# Patient Record
Sex: MALE | Race: BLACK OR AFRICAN AMERICAN | Employment: OTHER | URBAN - METROPOLITAN AREA
[De-identification: names, ages, dates, MRNs, and addresses within clinical notes are randomized per-mention and may not be internally consistent; named-entity substitution may affect disease eponyms.]

---

## 2017-08-14 ENCOUNTER — HOSPITAL ENCOUNTER (OUTPATIENT)
Dept: LAB | Age: 82
Discharge: HOME | End: 2017-08-14
Payer: MEDICARE

## 2017-08-14 DIAGNOSIS — E11.9 DIABETES MELLITUS TYPE 2, CONTROLLED (HCC): ICD-10-CM

## 2017-08-14 DIAGNOSIS — I10 HYPERTENSION, ESSENTIAL: ICD-10-CM

## 2017-08-14 DIAGNOSIS — E78.5 OTHER AND UNSPECIFIED HYPERLIPIDEMIA: ICD-10-CM

## 2017-08-14 LAB
ALBUMIN SERPL BCP-MCNC: 3.2 G/DL (ref 3.4–5)
ALBUMIN/GLOB SERPL: 0.8 {RATIO} (ref 0.8–1.7)
ALP SERPL-CCNC: 66 U/L (ref 45–117)
ALT SERPL-CCNC: 25 U/L (ref 16–61)
ANION GAP BLD CALC-SCNC: 7 MMOL/L (ref 3–18)
AST SERPL W P-5'-P-CCNC: 28 U/L (ref 15–37)
BASOPHILS # BLD AUTO: 0 K/UL (ref 0–0.06)
BASOPHILS # BLD: 0 % (ref 0–2)
BILIRUB SERPL-MCNC: 0.3 MG/DL (ref 0.2–1)
BUN SERPL-MCNC: 10 MG/DL (ref 7–18)
BUN/CREAT SERPL: 9 (ref 12–20)
CALCIUM SERPL-MCNC: 9.6 MG/DL (ref 8.5–10.1)
CHLORIDE SERPL-SCNC: 104 MMOL/L (ref 100–108)
CHOLEST SERPL-MCNC: 163 MG/DL
CO2 SERPL-SCNC: 32 MMOL/L (ref 21–32)
CREAT SERPL-MCNC: 1.14 MG/DL (ref 0.6–1.3)
CREAT UR-MCNC: 226 MG/DL (ref 30–125)
DIFFERENTIAL METHOD BLD: ABNORMAL
EOSINOPHIL # BLD: 0.2 K/UL (ref 0–0.4)
EOSINOPHIL NFR BLD: 4 % (ref 0–5)
ERYTHROCYTE [DISTWIDTH] IN BLOOD BY AUTOMATED COUNT: 16 % (ref 11.6–14.5)
GLOBULIN SER CALC-MCNC: 3.9 G/DL (ref 2–4)
GLUCOSE SERPL-MCNC: 95 MG/DL (ref 74–99)
HBA1C MFR BLD: 6.7 % (ref 4.2–5.6)
HCT VFR BLD AUTO: 40.2 % (ref 36–48)
HDLC SERPL-MCNC: 49 MG/DL (ref 40–60)
HDLC SERPL: 3.3 {RATIO} (ref 0–5)
HGB BLD-MCNC: 13.2 G/DL (ref 13–16)
LDLC SERPL CALC-MCNC: 92.4 MG/DL (ref 0–100)
LIPID PROFILE,FLP: NORMAL
LYMPHOCYTES # BLD AUTO: 49 % (ref 21–52)
LYMPHOCYTES # BLD: 2.3 K/UL (ref 0.9–3.6)
MCH RBC QN AUTO: 27.2 PG (ref 24–34)
MCHC RBC AUTO-ENTMCNC: 32.8 G/DL (ref 31–37)
MCV RBC AUTO: 82.7 FL (ref 74–97)
MICROALBUMIN UR-MCNC: 1.67 MG/DL (ref 0–3)
MICROALBUMIN/CREAT UR-RTO: 7 MG/G (ref 0–30)
MONOCYTES # BLD: 0.5 K/UL (ref 0.05–1.2)
MONOCYTES NFR BLD AUTO: 11 % (ref 3–10)
NEUTS SEG # BLD: 1.6 K/UL (ref 1.8–8)
NEUTS SEG NFR BLD AUTO: 36 % (ref 40–73)
PLATELET # BLD AUTO: 214 K/UL (ref 135–420)
PMV BLD AUTO: 10.6 FL (ref 9.2–11.8)
POTASSIUM SERPL-SCNC: 3.6 MMOL/L (ref 3.5–5.5)
PROT SERPL-MCNC: 7.1 G/DL (ref 6.4–8.2)
RBC # BLD AUTO: 4.86 M/UL (ref 4.7–5.5)
SODIUM SERPL-SCNC: 143 MMOL/L (ref 136–145)
TRIGL SERPL-MCNC: 108 MG/DL (ref ?–150)
VLDLC SERPL CALC-MCNC: 21.6 MG/DL
WBC # BLD AUTO: 4.6 K/UL (ref 4.6–13.2)

## 2017-08-14 PROCEDURE — 85025 COMPLETE CBC W/AUTO DIFF WBC: CPT | Performed by: INTERNAL MEDICINE

## 2017-08-14 PROCEDURE — 83036 HEMOGLOBIN GLYCOSYLATED A1C: CPT | Performed by: INTERNAL MEDICINE

## 2017-08-14 PROCEDURE — 82043 UR ALBUMIN QUANTITATIVE: CPT | Performed by: INTERNAL MEDICINE

## 2017-08-14 PROCEDURE — 80053 COMPREHEN METABOLIC PANEL: CPT | Performed by: INTERNAL MEDICINE

## 2017-08-14 PROCEDURE — 80061 LIPID PANEL: CPT | Performed by: INTERNAL MEDICINE

## 2017-08-14 PROCEDURE — 36415 COLL VENOUS BLD VENIPUNCTURE: CPT | Performed by: INTERNAL MEDICINE

## 2018-01-17 ENCOUNTER — HOSPITAL ENCOUNTER (OUTPATIENT)
Dept: LAB | Age: 83
Discharge: HOME | End: 2018-01-17
Payer: MEDICARE

## 2018-01-17 DIAGNOSIS — E78.5 HYPERLIPEMIA: ICD-10-CM

## 2018-01-17 LAB
ALT SERPL-CCNC: 26 U/L (ref 16–61)
AST SERPL-CCNC: 32 U/L (ref 15–37)
CHOLEST SERPL-MCNC: 156 MG/DL
CK SERPL-CCNC: 233 U/L (ref 39–308)
HDLC SERPL-MCNC: 59 MG/DL (ref 40–60)
HDLC SERPL: 2.6 {RATIO} (ref 0–5)
LDLC SERPL CALC-MCNC: 79.4 MG/DL (ref 0–100)
LIPID PROFILE,FLP: NORMAL
TRIGL SERPL-MCNC: 88 MG/DL (ref ?–150)
VLDLC SERPL CALC-MCNC: 17.6 MG/DL

## 2018-01-17 PROCEDURE — 80061 LIPID PANEL: CPT | Performed by: INTERNAL MEDICINE

## 2018-01-17 PROCEDURE — 36415 COLL VENOUS BLD VENIPUNCTURE: CPT | Performed by: INTERNAL MEDICINE

## 2018-01-17 PROCEDURE — 82550 ASSAY OF CK (CPK): CPT | Performed by: INTERNAL MEDICINE

## 2018-01-17 PROCEDURE — 84460 ALANINE AMINO (ALT) (SGPT): CPT | Performed by: INTERNAL MEDICINE

## 2018-01-17 PROCEDURE — 84450 TRANSFERASE (AST) (SGOT): CPT | Performed by: INTERNAL MEDICINE

## 2018-12-03 ENCOUNTER — HOSPITAL ENCOUNTER (OUTPATIENT)
Dept: LAB | Age: 83
Discharge: HOME | End: 2018-12-03
Payer: MEDICARE

## 2018-12-03 DIAGNOSIS — E11.9 DIABETES MELLITUS (HCC): ICD-10-CM

## 2018-12-03 DIAGNOSIS — I10 ESSENTIAL HYPERTENSION, MALIGNANT: ICD-10-CM

## 2018-12-03 DIAGNOSIS — E78.5 HYPERLIPEMIA: ICD-10-CM

## 2018-12-03 LAB
ALBUMIN SERPL-MCNC: 3.3 G/DL (ref 3.4–5)
ALBUMIN/GLOB SERPL: 0.8 {RATIO} (ref 0.8–1.7)
ALP SERPL-CCNC: 82 U/L (ref 45–117)
ALT SERPL-CCNC: 23 U/L (ref 16–61)
ANION GAP SERPL CALC-SCNC: 6 MMOL/L (ref 3–18)
AST SERPL-CCNC: 30 U/L (ref 15–37)
BILIRUB SERPL-MCNC: 0.3 MG/DL (ref 0.2–1)
BUN SERPL-MCNC: 14 MG/DL (ref 7–18)
BUN/CREAT SERPL: 12 (ref 12–20)
CALCIUM SERPL-MCNC: 9.5 MG/DL (ref 8.5–10.1)
CHLORIDE SERPL-SCNC: 104 MMOL/L (ref 100–108)
CHOLEST SERPL-MCNC: 151 MG/DL
CO2 SERPL-SCNC: 31 MMOL/L (ref 21–32)
CREAT SERPL-MCNC: 1.19 MG/DL (ref 0.6–1.3)
GLOBULIN SER CALC-MCNC: 4 G/DL (ref 2–4)
GLUCOSE SERPL-MCNC: 83 MG/DL (ref 74–99)
HBA1C MFR BLD: 6.4 % (ref 4.2–5.6)
HDLC SERPL-MCNC: 49 MG/DL (ref 40–60)
HDLC SERPL: 3.1 {RATIO} (ref 0–5)
LDLC SERPL CALC-MCNC: 87.4 MG/DL (ref 0–100)
LIPID PROFILE,FLP: NORMAL
POTASSIUM SERPL-SCNC: 3.6 MMOL/L (ref 3.5–5.5)
PROT SERPL-MCNC: 7.3 G/DL (ref 6.4–8.2)
SODIUM SERPL-SCNC: 141 MMOL/L (ref 136–145)
TRIGL SERPL-MCNC: 73 MG/DL (ref ?–150)
VLDLC SERPL CALC-MCNC: 14.6 MG/DL

## 2018-12-03 PROCEDURE — 36415 COLL VENOUS BLD VENIPUNCTURE: CPT

## 2018-12-03 PROCEDURE — 83036 HEMOGLOBIN GLYCOSYLATED A1C: CPT

## 2018-12-03 PROCEDURE — 80061 LIPID PANEL: CPT

## 2018-12-03 PROCEDURE — 80053 COMPREHEN METABOLIC PANEL: CPT

## 2019-02-11 PROBLEM — C64.2 RENAL CELL CARCINOMA, LEFT (HCC): Status: ACTIVE | Noted: 2018-11-06

## 2019-06-07 ENCOUNTER — HOSPITAL ENCOUNTER (OUTPATIENT)
Dept: LAB | Age: 84
Discharge: HOME | End: 2019-06-07
Payer: MEDICARE

## 2019-06-07 DIAGNOSIS — E27.9 DISORDER OF ADRENAL GLAND, UNSPECIFIED (HCC): ICD-10-CM

## 2019-06-07 PROCEDURE — 36415 COLL VENOUS BLD VENIPUNCTURE: CPT

## 2019-06-07 PROCEDURE — 82533 TOTAL CORTISOL: CPT

## 2019-06-08 LAB — CORTIS AM PEAK SERPL-MCNC: 1.2 UG/DL (ref 4.3–22.45)

## 2021-05-03 ENCOUNTER — TELEPHONE (OUTPATIENT)
Dept: INTERNAL MEDICINE CLINIC | Age: 86
End: 2021-05-03

## 2021-05-03 ENCOUNTER — DOCUMENTATION ONLY (OUTPATIENT)
Dept: INTERNAL MEDICINE CLINIC | Age: 86
End: 2021-05-03

## 2021-05-03 ENCOUNTER — OFFICE VISIT (OUTPATIENT)
Dept: INTERNAL MEDICINE CLINIC | Age: 86
End: 2021-05-03
Payer: MEDICARE

## 2021-05-03 ENCOUNTER — HOSPITAL ENCOUNTER (OUTPATIENT)
Dept: LAB | Age: 86
Discharge: HOME | End: 2021-05-03
Payer: MEDICARE

## 2021-05-03 VITALS
HEIGHT: 68 IN | TEMPERATURE: 98.1 F | SYSTOLIC BLOOD PRESSURE: 133 MMHG | WEIGHT: 188.6 LBS | DIASTOLIC BLOOD PRESSURE: 68 MMHG | BODY MASS INDEX: 28.58 KG/M2 | OXYGEN SATURATION: 99 % | RESPIRATION RATE: 12 BRPM | HEART RATE: 73 BPM

## 2021-05-03 DIAGNOSIS — E11.9 TYPE 2 DIABETES MELLITUS TREATED WITHOUT INSULIN (HCC): ICD-10-CM

## 2021-05-03 DIAGNOSIS — E78.00 PURE HYPERCHOLESTEROLEMIA: ICD-10-CM

## 2021-05-03 DIAGNOSIS — F43.21 GRIEF: ICD-10-CM

## 2021-05-03 DIAGNOSIS — I10 ESSENTIAL HYPERTENSION: Primary | ICD-10-CM

## 2021-05-03 DIAGNOSIS — I10 ESSENTIAL HYPERTENSION: ICD-10-CM

## 2021-05-03 DIAGNOSIS — C64.2 RENAL CELL CARCINOMA OF LEFT KIDNEY (HCC): ICD-10-CM

## 2021-05-03 DIAGNOSIS — E27.8 ADRENAL MASS (HCC): ICD-10-CM

## 2021-05-03 DIAGNOSIS — R01.1 HEART MURMUR: ICD-10-CM

## 2021-05-03 LAB
ALBUMIN SERPL-MCNC: 3.3 G/DL (ref 3.4–5)
ALBUMIN/GLOB SERPL: 0.8 {RATIO} (ref 0.8–1.7)
ALP SERPL-CCNC: 86 U/L (ref 45–117)
ALT SERPL-CCNC: 22 U/L (ref 16–61)
ANION GAP SERPL CALC-SCNC: 6 MMOL/L (ref 3–18)
AST SERPL-CCNC: 28 U/L (ref 10–38)
BILIRUB SERPL-MCNC: 0.3 MG/DL (ref 0.2–1)
BUN SERPL-MCNC: 12 MG/DL (ref 7–18)
BUN/CREAT SERPL: 11 (ref 12–20)
CALCIUM SERPL-MCNC: 10.2 MG/DL (ref 8.5–10.1)
CHLORIDE SERPL-SCNC: 104 MMOL/L (ref 100–111)
CHOLEST SERPL-MCNC: 174 MG/DL
CO2 SERPL-SCNC: 32 MMOL/L (ref 21–32)
CREAT SERPL-MCNC: 1.11 MG/DL (ref 0.6–1.3)
CREAT UR-MCNC: 174 MG/DL (ref 30–125)
EST. AVERAGE GLUCOSE BLD GHB EST-MCNC: 117 MG/DL
GLOBULIN SER CALC-MCNC: 4 G/DL (ref 2–4)
GLUCOSE SERPL-MCNC: 116 MG/DL (ref 74–99)
HBA1C MFR BLD: 5.7 % (ref 4.2–5.6)
HDLC SERPL-MCNC: 40 MG/DL (ref 40–60)
HDLC SERPL: 4.4 {RATIO} (ref 0–5)
LDLC SERPL CALC-MCNC: 98 MG/DL (ref 0–100)
LIPID PROFILE,FLP: ABNORMAL
MICROALBUMIN UR-MCNC: 2.49 MG/DL (ref 0–3)
MICROALBUMIN/CREAT UR-RTO: 14 MG/G (ref 0–30)
POTASSIUM SERPL-SCNC: 3.7 MMOL/L (ref 3.5–5.5)
PROT SERPL-MCNC: 7.3 G/DL (ref 6.4–8.2)
SODIUM SERPL-SCNC: 142 MMOL/L (ref 136–145)
TRIGL SERPL-MCNC: 180 MG/DL (ref ?–150)
VLDLC SERPL CALC-MCNC: 36 MG/DL

## 2021-05-03 PROCEDURE — 80053 COMPREHEN METABOLIC PANEL: CPT

## 2021-05-03 PROCEDURE — 99215 OFFICE O/P EST HI 40 MIN: CPT | Performed by: INTERNAL MEDICINE

## 2021-05-03 PROCEDURE — 36415 COLL VENOUS BLD VENIPUNCTURE: CPT

## 2021-05-03 PROCEDURE — 82570 ASSAY OF URINE CREATININE: CPT

## 2021-05-03 PROCEDURE — G0463 HOSPITAL OUTPT CLINIC VISIT: HCPCS | Performed by: INTERNAL MEDICINE

## 2021-05-03 PROCEDURE — 80061 LIPID PANEL: CPT

## 2021-05-03 PROCEDURE — 83036 HEMOGLOBIN GLYCOSYLATED A1C: CPT

## 2021-05-03 RX ORDER — METOPROLOL SUCCINATE 25 MG/1
25 TABLET, EXTENDED RELEASE ORAL DAILY
Qty: 90 TAB | Refills: 3 | Status: SHIPPED | OUTPATIENT
Start: 2021-05-03 | End: 2022-06-17 | Stop reason: SDUPTHER

## 2021-05-03 RX ORDER — POTASSIUM CHLORIDE 750 MG/1
10 TABLET, EXTENDED RELEASE ORAL DAILY
Qty: 90 TAB | Refills: 3 | Status: SHIPPED | OUTPATIENT
Start: 2021-05-03 | End: 2022-05-05 | Stop reason: SDUPTHER

## 2021-05-03 NOTE — PROGRESS NOTES
HPI    Mr. Ame Hull is known to me from my previous practice. He is here for routine check. Since I last saw him in January of this year, his wife passed away from complications of congestive heart failure. He has one of his children living with him now, and when she moves out, another will be moving into his house with him. He is happy about this. He feels he is coping fairly well with his grief. He cooks for himself, and is sleeping well. 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 90 Tab 3    potassium chloride (KLOR-CON) 10 mEq tablet Take 1 Tab by mouth daily. 90 Tab 3    latanoprost (XALATAN) 0.005 % ophthalmic solution 1 Drop.  therapeutic multivitamin (THEREMS) tablet Take 1 Tab by mouth.  rosuvastatin (CRESTOR) 10 mg tablet       ALPHAGAN P 0.1 % ophthalmic solution INSTILL 1 DROP INTO BOTH EYES TWICE A DAY  4    fluticasone-salmeterol (ADVAIR DISKUS) 250-50 mcg/dose diskus inhaler Take 1 Puff by inhalation every twelve (12) hours.  hydrochlorothiazide (HYDRODIURIL) 25 mg tablet   0    tiotropium (SPIRIVA WITH HANDIHALER) 18 mcg inhalation capsule Take 1 Cap by inhalation daily.  simvastatin (ZOCOR) 40 mg tablet Take 1 Tab by mouth nightly. 90 Tab 3    amlodipine (NORVASC) 5 mg tablet Take 1 Tab by mouth daily.  90 Tab 3 Allergies   Allergen Reactions    Ace Inhibitors Anaphylaxis    Pravastatin Myalgia    Sulfa (Sulfonamide Antibiotics) Unknown (comments)        Social History     Socioeconomic History    Marital status:      Spouse name: Not on file    Number of children: Not on file    Years of education: Not on file    Highest education level: Not on file   Occupational History    Not on file   Social Needs    Financial resource strain: Not on file    Food insecurity     Worry: Not on file     Inability: Not on file    Transportation needs     Medical: Not on file     Non-medical: Not on file   Tobacco Use    Smoking status: Former Smoker     Quit date: 3/15/2009     Years since quittin.1    Smokeless tobacco: Never Used   Substance and Sexual Activity    Alcohol use: No    Drug use: No    Sexual activity: Never   Lifestyle    Physical activity     Days per week: Not on file     Minutes per session: Not on file    Stress: Not on file   Relationships    Social connections     Talks on phone: Not on file     Gets together: Not on file     Attends Evangelical service: Not on file     Active member of club or organization: Not on file     Attends meetings of clubs or organizations: Not on file     Relationship status: Not on file    Intimate partner violence     Fear of current or ex partner: Not on file     Emotionally abused: Not on file     Physically abused: Not on file     Forced sexual activity: Not on file   Other Topics Concern    Not on file   Social History Narrative    Not on file        Family History   Problem Relation Age of Onset    Diabetes Mother         type 2    Heart Failure Brother         [facility] (BP 1 Location: Right upper arm, BP Patient Position: Sitting, BP Cuff Size: Large adult)   Pulse 73   Temp 98.1 °F (36.7 °C) (Oral)   Resp 12   Ht 5' 8\" (1.727 m)   Wt 188 lb 9.6 oz (85.5 kg)   SpO2 99%   BMI 28.68 kg/m² ROS    General: He denies weight loss or loss of appetite  HEENT: He denies headache or blurry vision  Cardiovascular: He denies chest pain, PND, orthopnea. Pulmonary: His dyspnea on exertion from COPD is stable. GI: No rectal bleeding  : No gross hematuria  Psych: Denies maribell anxiety or depression    Physical Exam    HEENT: He is well-developed, well-nourished, in no acute distress  HEENT: No icterus or pallor  Cardiovascular: Regular rate and rhythm, 2/6 systolic murmur, no rubs, no gallops  Lungs: Clear to auscultation, good air movement, speaks full sentences easily  Abdomen: Nontender, no organomegaly  Extremities: No clubbing, cyanosis, or edema. Pedal pulses are normal bilaterally. Neuro: Alert and oriented  Psych: Mood is appropriate            Diagnoses and all orders for this visit:    1. Essential hypertension  Comments:  Controlled, continue medication. Check BMP today. Urinalysis possibly checked by urology. Orders:  -     metoprolol succinate (TOPROL-XL) 25 mg XL tablet; Take 1 Tab by mouth daily. -     potassium chloride (KLOR-CON) 10 mEq tablet; Take 1 Tab by mouth daily.  -     METABOLIC PANEL, COMPREHENSIVE; Future    2. Renal cell carcinoma of left kidney [facility])  Comments:  No evidence of recurrence on January 2021 CT scan, reviewed with patient    3. Adrenal mass [facility])  Comments:  Characteristics suggestive of benign adenoma, stable on recent imaging January 2021    4. Pure hypercholesterolemia  Comments:  Check nonfasting lipid panel today  Orders:  -     METABOLIC PANEL, COMPREHENSIVE; Future  -     LIPID PANEL; Future    5. Type 2 diabetes mellitus treated without insulin (HCC)  Comments:  A1c, random urine microalbumin today  Orders:  -     HEMOGLOBIN A1C WITH EAG; Future  -     MICROALBUMIN, UR, RAND W/ MICROALB/CREAT RATIO; Future  -     METABOLIC PANEL, COMPREHENSIVE; Future    6. Grief  Comments:  Seems to be mourning his wife normally, no evidence of maribell depression.     7. Heart murmur  Comments:  Patient states does not recall previous diagnosis of this. I will review the chart. Asymptomatic, report new developments. Follow-up and Dispositions    · Return in about 6 months (around 11/3/2021), or if symptoms worsen or fail to improve, for 30 minute-- Medicare wellness. Total time spent on encounter, and minutes:    Review of old records plus precharting13  Updated review of medical, social, family, and surgical history with patient12   review of systems8  Exam7  Orders4    Total time spent on encounter: 42 minutes.       Ayanna Freire MD

## 2021-05-03 NOTE — PROGRESS NOTES
1. Have you been to the ER, urgent care clinic or hospitalized since your last visit? NO           2. Have you seen or consulted any other health care providers outside of the [address] [place] [address] since your last visit (Include any pap smears or colon screening)? NO    Do you have an Advanced Directive? YES    Would you like information on Advanced Directives?  NO

## 2021-05-03 NOTE — PROGRESS NOTES
At check out patient dropped off a form for bp medication price reduction. Form was put in the providers box.

## 2021-06-18 ENCOUNTER — OFFICE VISIT (OUTPATIENT)
Dept: INTERNAL MEDICINE CLINIC | Age: 86
End: 2021-06-18
Payer: MEDICARE

## 2021-06-18 VITALS
TEMPERATURE: 98.1 F | WEIGHT: 191 LBS | RESPIRATION RATE: 12 BRPM | OXYGEN SATURATION: 96 % | BODY MASS INDEX: 28.95 KG/M2 | HEART RATE: 60 BPM | SYSTOLIC BLOOD PRESSURE: 133 MMHG | HEIGHT: 68 IN | DIASTOLIC BLOOD PRESSURE: 78 MMHG

## 2021-06-18 DIAGNOSIS — R06.09 DYSPNEA ON EXERTION: ICD-10-CM

## 2021-06-18 DIAGNOSIS — E11.9 TYPE 2 DIABETES MELLITUS TREATED WITHOUT INSULIN (HCC): ICD-10-CM

## 2021-06-18 DIAGNOSIS — R60.0 BILATERAL LOWER EXTREMITY EDEMA: Primary | ICD-10-CM

## 2021-06-18 DIAGNOSIS — R07.89 OTHER CHEST PAIN: ICD-10-CM

## 2021-06-18 LAB — HBA1C MFR BLD HPLC: 5.9 %

## 2021-06-18 PROCEDURE — 99215 OFFICE O/P EST HI 40 MIN: CPT | Performed by: INTERNAL MEDICINE

## 2021-06-18 PROCEDURE — G0463 HOSPITAL OUTPT CLINIC VISIT: HCPCS | Performed by: INTERNAL MEDICINE

## 2021-06-18 PROCEDURE — 93005 ELECTROCARDIOGRAM TRACING: CPT | Performed by: INTERNAL MEDICINE

## 2021-06-18 PROCEDURE — 83036 HEMOGLOBIN GLYCOSYLATED A1C: CPT | Performed by: INTERNAL MEDICINE

## 2021-06-18 PROCEDURE — 93010 ELECTROCARDIOGRAM REPORT: CPT | Performed by: INTERNAL MEDICINE

## 2021-06-18 RX ORDER — NITROGLYCERIN 0.4 MG/1
0.4 TABLET SUBLINGUAL
Qty: 25 TABLET | Refills: 1 | Status: SHIPPED | OUTPATIENT
Start: 2021-06-18

## 2021-06-18 NOTE — PROGRESS NOTES
HPI     Mr. Catarino Jama is an 82-year-old man, known to me from my previous practice. Here is here with one of his daughters today for increased lower extremity edema, which she reports was noticed by one of his family members. On questioning, he does have some dyspnea on exertion, which she always has with his COPD, unclear if it is worse. On questioning, he also states he has \"indigestion\", and puts his hand over his left upper chest area. On further questioning, this is a sort of pressure that he feels. It is not exertional, but may be associated with mildly more dyspnea. It is usually relieved with burping. It is sometimes, but not always, associated with eating a few hours earlier. 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    COPD (chronic obstructive pulmonary disease) (HCC)     CVA (cerebral infarction) 1/29/2010    Emphysema 1/29/2010    HTN (hypertension) 1/29/2010    Hypercholesterolemia     Right renal mass     Solitary pulmonary nodule     Type 2 diabetes mellitus (HCC)         Past Surgical History:   Procedure Laterality Date    HX HERNIA REPAIR      lt inguinal    HX OTHER SURGICAL      INTRACRANIAL ANEURYSM REPAIR    HX TONSILLECTOMY          Current Outpatient Medications   Medication Sig Dispense Refill    nitroglycerin (NITROSTAT) 0.4 mg SL tablet 1 Tablet by SubLINGual route every five (5) minutes as needed for Chest Pain. Up to 3 doses. 25 Tablet 1    metoprolol succinate (TOPROL-XL) 25 mg XL tablet Take 1 Tab by mouth daily. 90 Tab 3    potassium chloride (KLOR-CON) 10 mEq tablet Take 1 Tab by mouth daily. 90 Tab 3    latanoprost (XALATAN) 0.005 % ophthalmic solution 1 Drop.  therapeutic multivitamin (THEREMS) tablet Take 1 Tab by mouth.       rosuvastatin (CRESTOR) 10 mg tablet       ALPHAGAN P 0.1 % ophthalmic solution INSTILL 1 DROP INTO BOTH EYES TWICE A DAY  4    fluticasone-salmeterol (ADVAIR DISKUS) 250-50 mcg/dose diskus inhaler Take 1 Puff by inhalation every twelve (12) hours.  hydrochlorothiazide (HYDRODIURIL) 25 mg tablet   0    tiotropium (SPIRIVA WITH HANDIHALER) 18 mcg inhalation capsule Take 1 Cap by inhalation daily.  simvastatin (ZOCOR) 40 mg tablet Take 1 Tab by mouth nightly. 90 Tab 3    amlodipine (NORVASC) 5 mg tablet Take 1 Tab by mouth daily. 90 Tab 3        Allergies   Allergen Reactions    Ace Inhibitors Anaphylaxis    Pravastatin Myalgia    Sulfa (Sulfonamide Antibiotics) Unknown (comments)        Social History     Socioeconomic History    Marital status:      Spouse name: Not on file    Number of children: Not on file    Years of education: Not on file    Highest education level: Not on file   Occupational History    Not on file   Tobacco Use    Smoking status: Former Smoker     Quit date: 3/15/2009     Years since quittin.2    Smokeless tobacco: Never Used   Substance and Sexual Activity    Alcohol use: No    Drug use: No    Sexual activity: Never   Other Topics Concern    Not on file   Social History Narrative    Not on file     Social Determinants of Health     Financial Resource Strain:     Difficulty of Paying Living Expenses:    Food Insecurity:     Worried About Running Out of Food in the Last Year:     [address] in the Last Year:    Transportation Needs:     Lack of Transportation (Medical):      Lack of Transportation (Non-Medical):    Physical Activity:     Days of Exercise per Week:     Minutes of Exercise per Session:    Stress:     Feeling of Stress :    Social Connections:     Frequency of Communication with Friends and Family:     Frequency of Social Gatherings with Friends and Family:     Attends Scientology Services:     Active Member of Clubs or Organizations:     Attends Club or Organization Meetings:     Marital Status:    Intimate Partner Violence:     Fear of Current or Ex-Partner:     Emotionally Abused:     Physically Abused:     Sexually Abused:         Family History   Problem Relation Age of Onset    Diabetes Mother         type 2    Heart Failure Brother         ulcer, killed [facility]           Visit Vitals  /78 (BP 1 Location: Left upper arm, BP Patient Position: Sitting, BP Cuff Size: Large adult)   Pulse 60   Temp 98.1 °F (36.7 °C) (Temporal)   Resp 12   Ht 5' 8\" (1.727 m)   Wt 191 lb (86.6 kg)   SpO2 96%   BMI 29.04 kg/m²        Review of Systems   Constitutional: Negative for diaphoresis. Eyes: Negative for blurred vision. Cardiovascular: Negative for chest pain, orthopnea and PND. Discomfort does not radiate to jaw or arms. Gastrointestinal: Negative for nausea and vomiting. Neurological: Negative for dizziness and headaches. Physical Exam  Constitutional:       General: He is not in acute distress. Appearance: He is not ill-appearing. Eyes:      General: No scleral icterus. Conjunctiva/sclera: Conjunctivae normal.   Neck:      Comments: Normal bilateral carotid upstrokes. Cardiovascular:      Rate and Rhythm: Normal rate and regular rhythm. Pulses: Normal pulses. Heart sounds: Murmur heard. No friction rub. No gallop. Pulmonary:      Effort: Pulmonary effort is normal.      Breath sounds: Normal breath sounds. Abdominal:      General: Abdomen is flat. Palpations: Abdomen is soft. There is no mass. Tenderness: There is no abdominal tenderness. Musculoskeletal:      Cervical back: Neck supple. Comments: No clubbing, cyanosis, or calf tenderness. There are no cords. There is 1+ pitting edema of both distal lower extremities, up to the knees. Skin:     General: Skin is warm and dry. Neurological:      Mental Status: He is alert and oriented to person, place, and time.    Psychiatric:         Mood and Affect: Mood normal.        EKG sinus bradycardia with LVH, no old EKG for comparison. Diagnoses and all orders for this visit:    1. Bilateral lower extremity edema  Comments:  Discussed with patient and daughter DDx includes CHF, elevated PA pressures, venous insufficiency--check stress echo, limit sodium, elevate legs when sitting. Orders:  -     ECHO STRESS; Future    2. Other chest pain  Comments:  Advised patient this COULD be indigestion, but with CV risk factors, check stress echo, start ASA 81 mg, Rx SL NTG, seek care ASAP if increases  Orders:  -     AMB POC EKG ROUTINE W/ 12 LEADS, INTER & REP  -     ECHO STRESS; Future  -     nitroglycerin (NITROSTAT) 0.4 mg SL tablet; 1 Tablet by SubLINGual route every five (5) minutes as needed for Chest Pain. Up to 3 doses. 3. Dyspnea on exertion  Comments:  Has baseline COPD, but with chest pain and increased lower extremity edema, check stress echo, rule out ischemia, CHF, valvular disease. Orders:  -     ECHO STRESS; Future    4. Type 2 diabetes mellitus treated without insulin (HCC)  Comments:  A1c in good range, continue to monitor with A1c twice yearly. Orders:  -     AMB POC HEMOGLOBIN A1C    Follow-up in 4 weeks, after testing, seek care ASAP sooner as needed changing, worsening, or increase in symptoms. Total time spent on encounter:, In minutes:    Review of old medical records prior to patient arrival, plus precharting12  Review of updated medical, family, social history with patient and daughter5  Review of systems8  Physical exam8  Counseling5  Orders2  Completion of chart5    Total time: 53 minutes    Follow-up and Dispositions    · Return in about 4 weeks (around 7/16/2021).               Silvana Hamilton MD

## 2021-06-25 DIAGNOSIS — R06.09 DYSPNEA ON EXERTION: ICD-10-CM

## 2021-06-25 DIAGNOSIS — R07.89 OTHER CHEST PAIN: ICD-10-CM

## 2021-06-25 DIAGNOSIS — R60.0 BILATERAL LOWER EXTREMITY EDEMA: ICD-10-CM

## 2021-07-19 ENCOUNTER — TELEPHONE (OUTPATIENT)
Dept: INTERNAL MEDICINE CLINIC | Age: 86
End: 2021-07-19

## 2021-07-19 DIAGNOSIS — R07.89 OTHER CHEST PAIN: Primary | ICD-10-CM

## 2021-07-19 NOTE — TELEPHONE ENCOUNTER
Nuclear stress test was ordered. They will contact patient to schedule the updated test. Patient is aware not to go for the stress echo tomorrow.

## 2021-07-19 NOTE — TELEPHONE ENCOUNTER
Called and spoke with Marlin Pelaez she was reviewing patients chart and saw in [facility] where patient had a regular echo done in 1/8/2021 at [facility] that showed Aortic Stenosis. She said they usually do not do a repeat stress echo for this dx and if they do its usually done at a hospital with a different stressing agent not with the patient completing the treadmill stress test. She just want to make sure the provider was aware of the previous echo results. Patient is currently scheduled for tomorrow so they need to know what to do.

## 2021-07-19 NOTE — TELEPHONE ENCOUNTER
Leopold Piano with [facility] is calling back. She doesn't want pt to show up tomorrow and can't get echo done. Please call her asap at [phone] or 1st thing in morning.

## 2021-07-19 NOTE — TELEPHONE ENCOUNTER
Darell Vela is calling from [facility] in regards to the Stress Test that Dr. Alec Flores ordered. She need to speak with a nurse today because patient is scheduled for tomorrow.

## 2021-07-20 NOTE — TELEPHONE ENCOUNTER
Patient was contacted by [facility] to schedule but he wants to go to [facility]

## 2021-07-21 RX ORDER — ROSUVASTATIN CALCIUM 10 MG/1
10 TABLET, COATED ORAL DAILY
Qty: 90 TABLET | Refills: 3 | Status: SHIPPED | OUTPATIENT
Start: 2021-07-21 | End: 2022-05-12 | Stop reason: SDUPTHER

## 2021-07-21 NOTE — TELEPHONE ENCOUNTER
Last Visit: 6/18/21 with MD Charlotte Leonardo  Next Appointment: 7/27/21 with MD Charlotte Leonardo    Requested Prescriptions     Pending Prescriptions Disp Refills    rosuvastatin (CRESTOR) 10 mg tablet 90 Tablet 1     Sig: Take 1 Tablet by mouth daily.

## 2021-07-26 DIAGNOSIS — R07.89 OTHER CHEST PAIN: ICD-10-CM

## 2021-07-27 ENCOUNTER — TELEPHONE (OUTPATIENT)
Dept: INTERNAL MEDICINE CLINIC | Age: 86
End: 2021-07-27

## 2021-07-27 NOTE — TELEPHONE ENCOUNTER
Daughter calling, says pt stress test was changed to 8/3. Do you still want to see the patient today or do you want to reschedule him?

## 2021-07-27 NOTE — TELEPHONE ENCOUNTER
Patient appt has been cancelled for today.  The patient will call back to schedule another appointment after his procedure on 8/3/2021

## 2021-09-03 ENCOUNTER — TELEPHONE (OUTPATIENT)
Dept: INTERNAL MEDICINE CLINIC | Age: 86
End: 2021-09-03

## 2021-09-03 NOTE — TELEPHONE ENCOUNTER
The stress test showed a small area at the bottom tip of his heart that could be a small scar from a previous heart attack, but can also be an artifact finding. The rest of his heart looks great, normal function, no heart failure. Please remind him in the future to call it they have had a test and I have not called him with the results after 7 to 10 days. Since he had a done at [facility], I had to look for the special place in the chart. Thanks.

## 2021-09-03 NOTE — TELEPHONE ENCOUNTER
Pt calling, says he had a stress test at [facility] on 8/3.  Wants results

## 2021-09-03 NOTE — TELEPHONE ENCOUNTER
Patient is aware is stress showed a small area at the bottom of tip of his heart that could be a scar from a previous heart attack, but it can also be artifact finding. There rest of his heart looks great, normal function and no heart failure. Patient was reminded to call office a few days after having a test done at a [facility] so that office can get the results. Patient verbalizes understanding.

## 2021-10-08 DIAGNOSIS — E27.8 ADRENAL MASS (HCC): ICD-10-CM

## 2021-10-11 RX ORDER — HYDROCHLOROTHIAZIDE 25 MG/1
25 TABLET ORAL DAILY
Qty: 90 TABLET | Refills: 1 | Status: SHIPPED | OUTPATIENT
Start: 2021-10-11 | End: 2022-04-13 | Stop reason: SDUPTHER

## 2021-10-11 NOTE — TELEPHONE ENCOUNTER
Last Visit: 5/3/21 with MD Meggan Cotto  Next Appointment: 11/4/21 with MD Meggan Cotto    Requested Prescriptions     Pending Prescriptions Disp Refills    hydroCHLOROthiazide (HYDRODIURIL) 25 mg tablet 90 Tablet 1     Sig: Take 1 Tablet by mouth daily.

## 2021-10-20 DIAGNOSIS — I10 ESSENTIAL HYPERTENSION: ICD-10-CM

## 2021-10-20 RX ORDER — MONTELUKAST SODIUM 10 MG/1
10 TABLET ORAL DAILY
Qty: 90 TABLET | Refills: 1 | Status: SHIPPED | OUTPATIENT
Start: 2021-10-20 | End: 2022-01-21 | Stop reason: SDUPTHER

## 2021-10-20 RX ORDER — AMLODIPINE BESYLATE 10 MG/1
10 TABLET ORAL DAILY
Qty: 90 TABLET | Refills: 3 | Status: SHIPPED | OUTPATIENT
Start: 2021-10-20 | End: 2022-06-17

## 2021-10-20 NOTE — TELEPHONE ENCOUNTER
Last Visit: 6/18/21 with MD Alma Whelan  Next Appointment: 11/4/21 with MD Alma Whelan    Requested Prescriptions     Pending Prescriptions Disp Refills    amLODIPine (Norvasc) 10 mg tablet 90 Tablet 1     Sig: Take 1 Tablet by mouth daily.  montelukast (SINGULAIR) 10 mg tablet 90 Tablet 1     Sig: Take 1 Tablet by mouth daily.        From outside meds tab:

## 2021-11-04 ENCOUNTER — OFFICE VISIT (OUTPATIENT)
Dept: INTERNAL MEDICINE CLINIC | Age: 86
End: 2021-11-04
Payer: MEDICARE

## 2021-11-04 VITALS
HEART RATE: 62 BPM | BODY MASS INDEX: 29.22 KG/M2 | SYSTOLIC BLOOD PRESSURE: 131 MMHG | WEIGHT: 192.8 LBS | HEIGHT: 68 IN | TEMPERATURE: 97.3 F | DIASTOLIC BLOOD PRESSURE: 67 MMHG | OXYGEN SATURATION: 96 % | RESPIRATION RATE: 16 BRPM

## 2021-11-04 DIAGNOSIS — I10 ESSENTIAL HYPERTENSION: ICD-10-CM

## 2021-11-04 DIAGNOSIS — Z71.89 ADVANCED DIRECTIVES, COUNSELING/DISCUSSION: ICD-10-CM

## 2021-11-04 DIAGNOSIS — Z00.00 MEDICARE ANNUAL WELLNESS VISIT, SUBSEQUENT: ICD-10-CM

## 2021-11-04 DIAGNOSIS — E11.9 TYPE 2 DIABETES MELLITUS TREATED WITHOUT INSULIN (HCC): ICD-10-CM

## 2021-11-04 DIAGNOSIS — E78.00 PURE HYPERCHOLESTEROLEMIA: Primary | ICD-10-CM

## 2021-11-04 PROCEDURE — G0439 PPPS, SUBSEQ VISIT: HCPCS | Performed by: INTERNAL MEDICINE

## 2021-11-04 PROCEDURE — G8419 CALC BMI OUT NRM PARAM NOF/U: HCPCS | Performed by: INTERNAL MEDICINE

## 2021-11-04 PROCEDURE — 99497 ADVNCD CARE PLAN 30 MIN: CPT | Performed by: INTERNAL MEDICINE

## 2021-11-04 PROCEDURE — G8536 NO DOC ELDER MAL SCRN: HCPCS | Performed by: INTERNAL MEDICINE

## 2021-11-04 PROCEDURE — G8754 DIAS BP LESS 90: HCPCS | Performed by: INTERNAL MEDICINE

## 2021-11-04 PROCEDURE — 1101F PT FALLS ASSESS-DOCD LE1/YR: CPT | Performed by: INTERNAL MEDICINE

## 2021-11-04 PROCEDURE — G8510 SCR DEP NEG, NO PLAN REQD: HCPCS | Performed by: INTERNAL MEDICINE

## 2021-11-04 PROCEDURE — G8427 DOCREV CUR MEDS BY ELIG CLIN: HCPCS | Performed by: INTERNAL MEDICINE

## 2021-11-04 PROCEDURE — G8752 SYS BP LESS 140: HCPCS | Performed by: INTERNAL MEDICINE

## 2021-11-04 PROCEDURE — 99213 OFFICE O/P EST LOW 20 MIN: CPT | Performed by: INTERNAL MEDICINE

## 2021-11-04 PROCEDURE — G0463 HOSPITAL OUTPT CLINIC VISIT: HCPCS | Performed by: INTERNAL MEDICINE

## 2021-11-04 NOTE — PROGRESS NOTES
Depression Screening:  3 most recent PHQ Screens 11/4/2021   Little interest or pleasure in doing things Not at all   Feeling down, depressed, irritable, or hopeless Not at all   Total Score PHQ 2 0       Learning Assessment:  No flowsheet data found. Abuse Screening:  Abuse Screening Questionnaire 5/3/2021   Do you ever feel afraid of your partner? N   Are you in a relationship with someone who physically or mentally threatens you? N   Is it safe for you to go home? Y       Fall Risk  Fall Risk Assessment, last 12 mths 11/4/2021   Able to walk? Yes   Fall in past 12 months? 0   Do you feel unsteady? -   Are you worried about falling -       ADL  ADL Assessment 5/3/2021   Feeding yourself No Help Needed   Getting from bed to chair No Help Needed   Getting dressed No Help Needed   Bathing or showering No Help Needed   Walk across the room (includes cane/walker) No Help Needed   Using the telphone No Help Needed   Taking your medications No Help Needed   Preparing meals No Help Needed   Managing money (expenses/bills) No Help Needed   Moderately strenuous housework (laundry) No Help Needed   Shopping for personal items (toiletries/medicines) No Help Needed   Shopping for groceries No Help Needed   Driving No Help Needed   Climbing a flight of stairs No Help Needed   Getting to places beyond walking distances No Help Needed       Travel Screening:    Travel Screening     Question   Response    In the last month, have you been in contact with someone who was confirmed or suspected to have Coronavirus / COVID-19? No / Unsure    Have you had a COVID-19 viral test in the last 14 days? No    Do you have any of the following new or worsening symptoms? None of these    Have you traveled internationally or domestically in the last month? No      Travel History   Travel since 10/04/21    No documented travel since 10/04/21         Health Maintenance reviewed and discussed and ordered per Provider.     Health Maintenance Due   Topic Date Due    Foot Exam Q1  Never done    Eye Exam Retinal or Dilated  Never done    Shingrix Vaccine Age 50> (1 of 2) Never done    DTaP/Tdap/Td series (1 - Tdap) 01/21/2005    Pneumococcal 65+ years (1 of 1 - PPSV23) Never done    Medicare Yearly Exam  Never done    Flu Vaccine (1) 09/01/2021   . Alessandro Ang presents today for   Chief Complaint   Patient presents with    Follow-up       Is someone accompanying this pt? no    Is the patient using any DME equipment during OV? no    Coordination of Care:  1. Have you been to the ER, urgent care clinic since your last visit? Hospitalized since your last visit? no    2. Have you seen or consulted any other health care providers outside of the [address] [place] [address] since your last visit? Include any pap smears or colon screening.  no

## 2021-11-04 NOTE — PATIENT INSTRUCTIONS
Medicare Wellness Visit, Male    The best way to live healthy is to have a lifestyle where you eat a well-balanced diet, exercise regularly, limit alcohol use, and quit all forms of tobacco/nicotine, if applicable. Regular preventive services are another way to keep healthy. Preventive services (vaccines, screening tests, monitoring & exams) can help personalize your care plan, which helps you manage your own care. Screening tests can find health problems at the earliest stages, when they are easiest to treat. [facility] follows the current, evidence-based guidelines published by the [facility] Fred[facility] Julio ([facility]) when recommending preventive services for our patients. Because we follow these guidelines, sometimes recommendations change over time as research supports it. (For example, a prostate screening blood test is no longer routinely recommended for men with no symptoms). Of course, you and your doctor may decide to screen more often for some diseases, based on your risk and co-morbidities (chronic disease you are already diagnosed with). Preventive services for you include:  - Medicare offers their members a free annual wellness visit, which is time for you and your primary care provider to discuss and plan for your preventive service needs. Take advantage of this benefit every year!  -All adults over age 72 should receive the recommended pneumonia vaccines. Current USPSTF guidelines recommend a series of two vaccines for the best pneumonia protection.   -All adults should have a flu vaccine yearly and tetanus vaccine every 10 years.  -All adults age 48 and older should receive the shingles vaccines (series of two vaccines).        -All adults age 38-68 who are overweight should have a diabetes screening test once every three years.   -Other screening tests & preventive services for persons with diabetes include: an eye exam to screen for diabetic retinopathy, a kidney function test, a foot exam, and stricter control over your cholesterol.   -Cardiovascular screening for adults with routine risk involves an electrocardiogram (ECG) at intervals determined by the provider.   -Colorectal cancer screening should be done for adults age 54-65 with no increased risk factors for colorectal cancer. There are a number of acceptable methods of screening for this type of cancer. Each test has its own benefits and drawbacks. Discuss with your provider what is most appropriate for you during your annual wellness visit. The different tests include: colonoscopy (considered the best screening method), a fecal occult blood test, a fecal DNA test, and sigmoidoscopy.  -All adults born between [facility] should be screened once for Hepatitis C.  -An Abdominal Aortic Aneurysm (AAA) Screening is recommended for men age 73-68 who has ever smoked in their lifetime.      Here is a list of your current Health Maintenance items (your personalized list of preventive services) with a due date:  Health Maintenance Due   Topic Date Due    Diabetic Foot Care  Never done    Eye Exam  Never done    Shingles Vaccine (1 of 2) Never done    DTaP/Tdap/Td  (1 - Tdap) 01/21/2005    Pneumococcal Vaccine (1 of 1 - PPSV23) Never done    Annual Well Visit  Never done    Yearly Flu Vaccine (1) 09/01/2021

## 2021-11-04 NOTE — PROGRESS NOTES
This is the Subsequent Medicare Annual Wellness Exam, performed 12 months or more after the Initial AWV or the last Subsequent AWV    I have reviewed the patient's medical history in detail and updated the computerized patient record. Assessment/Plan   Education and counseling provided:  Are appropriate based on today's review and evaluation    1. Advanced directives, counseling/discussion  2. Medicare annual wellness visit, subsequent       Depression Risk Factor Screening     3 most recent PHQ Screens 5/3/2021   Little interest or pleasure in doing things Several days   Feeling down, depressed, irritable, or hopeless Several days   Total Score PHQ 2 2       Alcohol Risk Screen    Do you average more than 1 drink per night or more than 7 drinks a week: No    In the past three months have you have had more than 4 drinks containing alcohol on one occasion: No    HPI     Bonita Field is here for his annual Medicare wellness/advanced care planning visit. He reports that he was put on oxygen, but notes not feel he needs it. I told him he may want to at least wear it during the night. He has not yet received his portable oxygen. He does get some chest tightness sometimes when he is exerting himself, but has never tried to use his rescue inhaler for this. He does not carry his rescue inhaler with him, and I advised him to do so, and to take a puff or 2 of it next time he feels exertional chest tightness.         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    COPD (chronic obstructive pulmonary disease) (HCC)     CVA (cerebral infarction) 1/29/2010    Emphysema 1/29/2010    HTN (hypertension) 1/29/2010    Hypercholesterolemia     Right renal mass     Solitary pulmonary nodule     Type 2 diabetes mellitus ([facility] [address] [facility]         Past Surgical History:   Procedure Laterality Date    HX HERNIA REPAIR      lt inguinal    HX OTHER SURGICAL INTRACRANIAL ANEURYSM REPAIR    HX TONSILLECTOMY          Current Outpatient Medications   Medication Sig Dispense Refill    amLODIPine (NORVASC) 10 mg tablet Take 1 Tablet by mouth daily. 90 Tablet 3    montelukast (SINGULAIR) 10 mg tablet Take 1 Tablet by mouth daily. 90 Tablet 1    hydroCHLOROthiazide (HYDRODIURIL) 25 mg tablet Take 1 Tablet by mouth daily. 90 Tablet 1    rosuvastatin (CRESTOR) 10 mg tablet Take 1 Tablet by mouth daily. 90 Tablet 3    nitroglycerin (NITROSTAT) 0.4 mg SL tablet 1 Tablet by SubLINGual route every five (5) minutes as needed for Chest Pain. Up to 3 doses. 25 Tablet 1    metoprolol succinate (TOPROL-XL) 25 mg XL tablet Take 1 Tab by mouth daily. 90 Tab 3    potassium chloride (KLOR-CON) 10 mEq tablet Take 1 Tab by mouth daily. 90 Tab 3    latanoprost (XALATAN) 0.005 % ophthalmic solution 1 Drop.  therapeutic multivitamin (THEREMS) tablet Take 1 Tab by mouth.  ALPHAGAN P 0.1 % ophthalmic solution INSTILL 1 DROP INTO BOTH EYES TWICE A DAY  4    fluticasone-salmeterol (ADVAIR DISKUS) 250-50 mcg/dose diskus inhaler Take 1 Puff by inhalation every twelve (12) hours.  tiotropium (SPIRIVA WITH HANDIHALER) 18 mcg inhalation capsule Take 1 Cap by inhalation daily.  simvastatin (ZOCOR) 40 mg tablet Take 1 Tab by mouth nightly.  90 Tab 3        Allergies   Allergen Reactions    Ace Inhibitors Anaphylaxis    Pravastatin Myalgia    Sulfa (Sulfonamide Antibiotics) Unknown (comments)        Social History     Socioeconomic History    Marital status:      Spouse name: Not on file    Number of children: Not on file    Years of education: Not on file    Highest education level: Not on file   Occupational History    Not on file   Tobacco Use    Smoking status: Former Smoker     Quit date: 3/15/2009     Years since quittin.6    Smokeless tobacco: Never Used   Vaping Use    Vaping Use: Never used   Substance and Sexual Activity    Alcohol use: No    Drug use: No    Sexual activity: Never   Other Topics Concern    Not on file   Social History Narrative    Not on file     Social Determinants of Health     Financial Resource Strain:     Difficulty of Paying Living Expenses: Not on file   Food Insecurity:     Worried About Running Out of Food in the Last Year: Not on file    Melly of Food in the Last Year: Not on file   Transportation Needs:     Lack of Transportation (Medical): Not on file    Lack of Transportation (Non-Medical): Not on file   Physical Activity:     Days of Exercise per Week: Not on file    Minutes of Exercise per Session: Not on file   Stress:     Feeling of Stress : Not on file   Social Connections:     Frequency of Communication with Friends and Family: Not on file    Frequency of Social Gatherings with Friends and Family: Not on file    Attends [employer] Services: Not on file    Active Member of [address] [place] [address] [employer] or Organizations: Not on file    Attends Club or Organization Meetings: Not on file    Marital Status: Not on file   Intimate Partner Violence:     Fear of Current or Ex-Partner: Not on file    Emotionally Abused: Not on file    Physically Abused: Not on file    Sexually Abused: Not on file   Housing Stability:     Unable to Pay for Housing in the Last Year: Not on file    Number of [place] in the Last Year: Not on file    Unstable Housing in the Last Year: Not on file        Family History   Problem Relation Age of Onset    Diabetes Mother         type 2    Heart Failure Brother         ulcer, killed in [facility] 60  /67 (BP 1 Location: Left upper arm, BP Patient Position: Sitting)   Pulse 62   Temp 97.3 °F (36.3 °C) (Temporal)   Resp 16   Ht 5' 8\" (1.727 m)   Wt 192 lb 12.8 oz (87.5 kg)   SpO2 96%   BMI 29.32 kg/m²        Review of Systems   Respiratory: Positive for shortness of breath. Cardiovascular: Negative for orthopnea and PND. Gastrointestinal: Negative for blood in stool. Genitourinary: Negative for hematuria. Psychiatric/Behavioral: Negative for depression. The patient is not nervous/anxious. Physical Exam  Constitutional:       General: He is not in acute distress. Eyes:      General: No scleral icterus. Conjunctiva/sclera: Conjunctivae normal.   Neck:      Comments: Bilateral carotid upstrokes normal to palpation. Lymph: No cervical or supraclavicular lymphadenopathy. Cardiovascular:      Rate and Rhythm: Normal rate and regular rhythm. Pulses: Normal pulses. Heart sounds: Murmur heard. No friction rub. No gallop. Pulmonary:      Effort: Pulmonary effort is normal.      Breath sounds: Normal breath sounds. Comments: Speaks full sentences easily. Not wearing oxygen during visit. Abdominal:      Palpations: Abdomen is soft. Tenderness: There is no abdominal tenderness. Musculoskeletal:      Cervical back: Neck supple. Comments: No clubbing or cyanosis. There is mild bilateral lower ankle edema, nontender, no cords. Patient says this is not present on first awakening, but does occur as the day progresses. Skin:     General: Skin is warm and dry. Neurological:      Mental Status: He is alert and oriented to person, place, and time. Psychiatric:         Mood and Affect: Mood normal.                  Diagnoses and all orders for this visit:    1. Pure hypercholesterolemia  -     LIPID PANEL; Future    2. Advanced directives, counseling/discussion  -     ADVANCE CARE PLANNING FIRST 30 MINS    3. Medicare annual wellness visit, subsequent    4. Essential hypertension  -     METABOLIC PANEL, COMPREHENSIVE; Future    5. Type 2 diabetes mellitus treated without insulin (HCC)  -     HEMOGLOBIN A1C WITH EAG; Future         Follow-up and Dispositions    · Return in about 6 months (around 5/4/2022) for 15 minutes-- BP--- labs a week or 2 before. Marlene Crisostomo MD     Functional Ability and Level of Safety    Hearing:  The patient wears hearing aids. Activities of Daily Living: The home contains: railings, grab bars  Patient does total self care      Ambulation: with no difficulty     Fall Risk:  Fall Risk Assessment, last 12 mths 5/3/2021   Able to walk? Yes   Fall in past 12 months? 0   Do you feel unsteady?  0   Are you worried about falling 0      Abuse Screen:  Patient is not abused       Cognitive Screening    Has your family/caregiver stated any concerns about your memory: no     Cognitive Screening: Normal - Mini Cog Test-- ERROR: Cognitive screening abnormal, patient remembered 2 of 3 objects    Health Maintenance Due     Health Maintenance Due   Topic Date Due    Foot Exam Q1  Never done    Eye Exam Retinal or Dilated  Never done    Shingrix Vaccine Age 50> (1 of 2) Never done    DTaP/Tdap/Td series (1 - Tdap) 01/21/2005    Pneumococcal 65+ years (1 of 1 - PPSV23) Never done    Medicare Yearly Exam  Never done    Flu Vaccine (1) 09/01/2021       Patient Care Team   Patient Care Team:  Brien Herring MD as PCP - General (Internal Medicine)  Brien Herring [facility]CP [facility]  Saúl Alaniz DO as Physician (Internal Medicine)  Jared Prieto MD as Consulting Provider (Radiology)    History     Patient Active Problem List   Diagnosis Code    HTN (hypertension) I10    Hypercholesterolemia E78.00    CVA (cerebral infarction) I63.9    Emphysema J43.9    Renal lesion, left  N28.9    Renal cell carcinoma, left ([address] C64.2    Lightheadedness R42    Cerebral hemorrhage ([address] I61.9    Type 2 diabetes mellitus ([address] E11.9     Past Medical History:   Diagnosis Date    Adrenal mass ([address]     Cerebral hemorrhage ([address]     due to aneurysm    COPD (chronic obstructive pulmonary disease) ([address]     CVA (cerebral infarction) 1/29/2010    Emphysema 1/29/2010    HTN (hypertension) 1/29/2010    Hypercholesterolemia     Right renal mass     Solitary pulmonary nodule     Type 2 diabetes mellitus ([facility] [address]       Past Surgical History:   Procedure Laterality Date    HX HERNIA REPAIR      lt inguinal    HX OTHER SURGICAL      INTRACRANIAL ANEURYSM REPAIR    HX TONSILLECTOMY       Current Outpatient Medications   Medication Sig Dispense Refill    amLODIPine (NORVASC) 10 mg tablet Take 1 Tablet by mouth daily. 90 Tablet 3    montelukast (SINGULAIR) 10 mg tablet Take 1 Tablet by mouth daily. 90 Tablet 1    hydroCHLOROthiazide (HYDRODIURIL) 25 mg tablet Take 1 Tablet by mouth daily. 90 Tablet 1    rosuvastatin (CRESTOR) 10 mg tablet Take 1 Tablet by mouth daily. 90 Tablet 3    nitroglycerin (NITROSTAT) 0.4 mg SL tablet 1 Tablet by SubLINGual route every five (5) minutes as needed for Chest Pain. Up to 3 doses. 25 Tablet 1    metoprolol succinate (TOPROL-XL) 25 mg XL tablet Take 1 Tab by mouth daily. 90 Tab 3    potassium chloride (KLOR-CON) 10 mEq tablet Take 1 Tab by mouth daily. 90 Tab 3    latanoprost (XALATAN) 0.005 % ophthalmic solution 1 Drop.  therapeutic multivitamin (THEREMS) tablet Take 1 Tab by mouth.  ALPHAGAN P 0.1 % ophthalmic solution INSTILL 1 DROP INTO BOTH EYES TWICE A DAY  4    fluticasone-salmeterol (ADVAIR DISKUS) 250-50 mcg/dose diskus inhaler Take 1 Puff by inhalation every twelve (12) hours.  tiotropium (SPIRIVA WITH HANDIHALER) 18 mcg inhalation capsule Take 1 Cap by inhalation daily.  simvastatin (ZOCOR) 40 mg tablet Take 1 Tab by mouth nightly.  90 Tab 3     Allergies   Allergen Reactions    Ace Inhibitors Anaphylaxis    Pravastatin Myalgia    Sulfa (Sulfonamide Antibiotics) Unknown (comments)       Family History   Problem Relation Age of Onset    Diabetes Mother         type 2    Heart Failure Brother         ulcer, killed in [facility]     Social History     Tobacco Use    Smoking status: Former Smoker     Quit date: 3/15/2009     Years since quittin.6    Smokeless tobacco: Never Used   Substance Use Topics    Alcohol use: No         Kim Alvraez Amos Rodriguez MD

## 2021-11-04 NOTE — ACP (ADVANCE CARE PLANNING)
Advance Care Planning     Advance Care Planning (ACP) Physician/NP/PA Conversation      Date of Conversation: 11/4/2021  Conducted with: Patient with Decision Making Capacity    Healthcare Decision Maker:   No healthcare decision makers have been documented. Click here to complete [address] including selection of the Healthcare Decision Maker Relationship (ie \"Primary\")      Today we documented Decision Maker(s) consistent with Legal Next of Kin hierarchy. Care Preferences:    Hospitalization: \"If your health worsens and it becomes clear that your chance of recovery is unlikely, what would be your preference regarding hospitalization? \"  The patient would prefer hospitalization. Ventilation: \"If you were unable to breathe on your own and your chance of recovery was unlikely, what would be your preference about the use of a ventilator (breathing machine) if it was available to you? \"   The patient would prefer the use of a ventilator. Resuscitation: \"In the event your heart stopped as a result of an underlying serious health condition, would you want attempts to be made to restart your heart, or would you prefer a natural death? \"   Yes, attempt to resuscitate.     Additional topics discussed: artificial nutrition, ventilation preferences, hospitalization preferences and resuscitation preferences    Conversation Outcomes / Follow-Up Plan:   ACP complete - no further action today  Reviewed DNR/DNI and patient elects Full Code (Attempt Resuscitation)     Length of Voluntary ACP Conversation in minutes:  16 minutes    Armen Ruiz MD

## 2022-01-21 RX ORDER — MONTELUKAST SODIUM 10 MG/1
10 TABLET ORAL DAILY
Qty: 90 TABLET | Refills: 1 | Status: SHIPPED | OUTPATIENT
Start: 2022-01-21 | End: 2022-06-17 | Stop reason: SDUPTHER

## 2022-01-21 NOTE — TELEPHONE ENCOUNTER
Last Visit: 11/4/21 with MD Nicole Going  Next Appointment: 5/12/22 with MD Nicole Going  Previous Refill Encounter(s): 10/20/21 #90 with 1 refill    Requested Prescriptions     Pending Prescriptions Disp Refills    montelukast (SINGULAIR) 10 mg tablet 90 Tablet 1     Sig: Take 1 Tablet by mouth daily.

## 2022-03-19 PROBLEM — C64.2 RENAL CELL CARCINOMA, LEFT (HCC): Status: ACTIVE | Noted: 2018-11-06

## 2022-04-13 DIAGNOSIS — E27.8 ADRENAL MASS (HCC): ICD-10-CM

## 2022-04-13 RX ORDER — HYDROCHLOROTHIAZIDE 25 MG/1
25 TABLET ORAL DAILY
Qty: 90 TABLET | Refills: 1 | Status: SHIPPED | OUTPATIENT
Start: 2022-04-13 | End: 2022-05-12 | Stop reason: DRUGHIGH

## 2022-04-13 NOTE — TELEPHONE ENCOUNTER
Last Visit: 11/4/21 with MD Loreta Brown  Next Appointment: 5/12/22 with MD Loreta Brown  Previous Refill Encounter(s): 10/11/21 #90 with 1 refill    Requested Prescriptions     Pending Prescriptions Disp Refills    hydroCHLOROthiazide (HYDRODIURIL) 25 mg tablet 90 Tablet 1     Sig: Take 1 Tablet by mouth daily.

## 2022-05-05 ENCOUNTER — APPOINTMENT (OUTPATIENT)
Dept: INTERNAL MEDICINE CLINIC | Age: 87
End: 2022-05-05

## 2022-05-05 ENCOUNTER — HOSPITAL ENCOUNTER (OUTPATIENT)
Dept: LAB | Age: 87
Discharge: HOME | End: 2022-05-05
Payer: MEDICARE

## 2022-05-05 DIAGNOSIS — I10 ESSENTIAL HYPERTENSION: ICD-10-CM

## 2022-05-05 DIAGNOSIS — E78.00 PURE HYPERCHOLESTEROLEMIA: ICD-10-CM

## 2022-05-05 DIAGNOSIS — E11.9 TYPE 2 DIABETES MELLITUS TREATED WITHOUT INSULIN (HCC): ICD-10-CM

## 2022-05-05 LAB
ALBUMIN SERPL-MCNC: 3.7 G/DL (ref 3.4–5)
ALBUMIN/GLOB SERPL: 1 {RATIO} (ref 0.8–1.7)
ALP SERPL-CCNC: 58 U/L (ref 45–117)
ALT SERPL-CCNC: 20 U/L (ref 16–61)
ANION GAP SERPL CALC-SCNC: 6 MMOL/L (ref 3–18)
AST SERPL-CCNC: 27 U/L (ref 10–38)
BILIRUB SERPL-MCNC: 0.4 MG/DL (ref 0.2–1)
BUN SERPL-MCNC: 10 MG/DL (ref 7–18)
BUN/CREAT SERPL: 7 (ref 12–20)
CALCIUM SERPL-MCNC: 10.6 MG/DL (ref 8.5–10.1)
CHLORIDE SERPL-SCNC: 105 MMOL/L (ref 100–111)
CHOLEST SERPL-MCNC: 150 MG/DL
CO2 SERPL-SCNC: 32 MMOL/L (ref 21–32)
CREAT SERPL-MCNC: 1.38 MG/DL (ref 0.6–1.3)
EST. AVERAGE GLUCOSE BLD GHB EST-MCNC: 128 MG/DL
GLOBULIN SER CALC-MCNC: 3.6 G/DL (ref 2–4)
GLUCOSE SERPL-MCNC: 103 MG/DL (ref 74–99)
HBA1C MFR BLD: 6.1 % (ref 4.2–5.6)
HDLC SERPL-MCNC: 42 MG/DL (ref 40–60)
HDLC SERPL: 3.6 {RATIO} (ref 0–5)
LDLC SERPL CALC-MCNC: 84.2 MG/DL (ref 0–100)
LIPID PROFILE,FLP: NORMAL
POTASSIUM SERPL-SCNC: 3.6 MMOL/L (ref 3.5–5.5)
PROT SERPL-MCNC: 7.3 G/DL (ref 6.4–8.2)
SODIUM SERPL-SCNC: 143 MMOL/L (ref 136–145)
TRIGL SERPL-MCNC: 119 MG/DL (ref ?–150)
VLDLC SERPL CALC-MCNC: 23.8 MG/DL

## 2022-05-05 PROCEDURE — 80053 COMPREHEN METABOLIC PANEL: CPT

## 2022-05-05 PROCEDURE — 80061 LIPID PANEL: CPT

## 2022-05-05 PROCEDURE — 36415 COLL VENOUS BLD VENIPUNCTURE: CPT

## 2022-05-05 PROCEDURE — 83036 HEMOGLOBIN GLYCOSYLATED A1C: CPT

## 2022-05-05 RX ORDER — POTASSIUM CHLORIDE 750 MG/1
10 TABLET, EXTENDED RELEASE ORAL DAILY
Qty: 90 TABLET | Refills: 1 | Status: SHIPPED | OUTPATIENT
Start: 2022-05-05 | End: 2022-10-17 | Stop reason: SDUPTHER

## 2022-05-05 NOTE — TELEPHONE ENCOUNTER
Last Visit: 11/4/21 with MD Preet Carolina  Next Appointment: 5/12/22 with MD Preet Carolina  Previous Refill Encounter(s): 5/3/21 #90 with 3 refills    Requested Prescriptions     Pending Prescriptions Disp Refills    potassium chloride (KLOR-CON M10) 10 mEq tablet 90 Tablet 3     Sig: Take 1 Tablet by mouth daily.

## 2022-05-12 ENCOUNTER — OFFICE VISIT (OUTPATIENT)
Dept: INTERNAL MEDICINE CLINIC | Age: 87
End: 2022-05-12
Payer: MEDICARE

## 2022-05-12 VITALS
TEMPERATURE: 97.7 F | WEIGHT: 193.4 LBS | OXYGEN SATURATION: 97 % | BODY MASS INDEX: 29.31 KG/M2 | HEART RATE: 68 BPM | HEIGHT: 68 IN | RESPIRATION RATE: 18 BRPM | SYSTOLIC BLOOD PRESSURE: 121 MMHG | DIASTOLIC BLOOD PRESSURE: 59 MMHG

## 2022-05-12 DIAGNOSIS — E11.9 TYPE 2 DIABETES MELLITUS TREATED WITHOUT INSULIN (HCC): ICD-10-CM

## 2022-05-12 DIAGNOSIS — I10 ESSENTIAL HYPERTENSION: Primary | ICD-10-CM

## 2022-05-12 DIAGNOSIS — E83.52 HYPERCALCEMIA: ICD-10-CM

## 2022-05-12 DIAGNOSIS — R94.4 DECREASED GFR: ICD-10-CM

## 2022-05-12 PROBLEM — N18.30 CHRONIC RENAL DISEASE, STAGE III (HCC): Status: ACTIVE | Noted: 2022-05-12

## 2022-05-12 PROCEDURE — 99214 OFFICE O/P EST MOD 30 MIN: CPT | Performed by: INTERNAL MEDICINE

## 2022-05-12 PROCEDURE — G8510 SCR DEP NEG, NO PLAN REQD: HCPCS | Performed by: INTERNAL MEDICINE

## 2022-05-12 PROCEDURE — G8752 SYS BP LESS 140: HCPCS | Performed by: INTERNAL MEDICINE

## 2022-05-12 PROCEDURE — G8754 DIAS BP LESS 90: HCPCS | Performed by: INTERNAL MEDICINE

## 2022-05-12 PROCEDURE — G8419 CALC BMI OUT NRM PARAM NOF/U: HCPCS | Performed by: INTERNAL MEDICINE

## 2022-05-12 PROCEDURE — G8427 DOCREV CUR MEDS BY ELIG CLIN: HCPCS | Performed by: INTERNAL MEDICINE

## 2022-05-12 PROCEDURE — G8536 NO DOC ELDER MAL SCRN: HCPCS | Performed by: INTERNAL MEDICINE

## 2022-05-12 PROCEDURE — G0463 HOSPITAL OUTPT CLINIC VISIT: HCPCS | Performed by: INTERNAL MEDICINE

## 2022-05-12 PROCEDURE — 3044F HG A1C LEVEL LT 7.0%: CPT | Performed by: INTERNAL MEDICINE

## 2022-05-12 PROCEDURE — 1101F PT FALLS ASSESS-DOCD LE1/YR: CPT | Performed by: INTERNAL MEDICINE

## 2022-05-12 RX ORDER — ROSUVASTATIN CALCIUM 10 MG/1
10 TABLET, COATED ORAL DAILY
Qty: 90 TABLET | Refills: 3 | Status: SHIPPED | OUTPATIENT
Start: 2022-05-12

## 2022-05-12 RX ORDER — HYDROCHLOROTHIAZIDE 12.5 MG/1
12.5 TABLET ORAL DAILY
Qty: 90 TABLET | Refills: 1 | Status: SHIPPED | OUTPATIENT
Start: 2022-05-12 | End: 2022-08-23 | Stop reason: DRUGHIGH

## 2022-05-12 NOTE — PROGRESS NOTES
HPI     Mr. Ho Coronado is here with a family member for a routine check. On questioning, he takes his medications with sips of water, but drinks more Pepsi than water throughout the day. He denies the use of NSAIDs. No new family history reported. 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 90 Tablet 1    potassium chloride (KLOR-CON M10) 10 mEq tablet Take 1 Tablet by mouth daily. 90 Tablet 1    montelukast (SINGULAIR) 10 mg tablet Take 1 Tablet by mouth daily. 90 Tablet 1    amLODIPine (NORVASC) 10 mg tablet Take 1 Tablet by mouth daily. 90 Tablet 3    nitroglycerin (NITROSTAT) 0.4 mg SL tablet 1 Tablet by SubLINGual route every five (5) minutes as needed for Chest Pain. Up to 3 doses. 25 Tablet 1    metoprolol succinate (TOPROL-XL) 25 mg XL tablet Take 1 Tab by mouth daily. 90 Tab 3    latanoprost (XALATAN) 0.005 % ophthalmic solution 1 Drop.  therapeutic multivitamin (THEREMS) tablet Take 1 Tab by mouth.  ALPHAGAN P 0.1 % ophthalmic solution INSTILL 1 DROP INTO BOTH EYES TWICE A DAY  4    fluticasone-salmeterol (ADVAIR DISKUS) 250-50 mcg/dose diskus inhaler Take 1 Puff by inhalation every twelve (12) hours.  tiotropium (SPIRIVA WITH HANDIHALER) 18 mcg inhalation capsule Take 1 Cap by inhalation daily.       rosuvastatin (CRESTOR) 10 mg tablet Take 1 Tablet by mouth daily. 90 Tablet 3        Allergies   Allergen Reactions    Ace Inhibitors Anaphylaxis    Pravastatin Myalgia    Sulfa (Sulfonamide Antibiotics) Unknown (comments)        Social History     Socioeconomic History    Marital status:      Spouse name: Not on file    Number of children: Not on file    Years of education: Not on file    Highest education level: Not on file   Occupational History    Not on file   Tobacco Use    Smoking status: Former Smoker     Quit date: 3/15/2009     Years since quittin.1    Smokeless tobacco: Never Used   Vaping Use    Vaping Use: Never used   Substance and Sexual Activity    Alcohol use: No    Drug use: No    Sexual activity: Never   Other Topics Concern    Not on file   Social History Narrative    Not on file     Social Determinants of Health     Financial Resource Strain:     Difficulty of Paying Living Expenses: Not on file   Food Insecurity:     Worried About Running Out of Food in the Last Year: Not on file    Melly of Food in the Last Year: Not on file   Transportation Needs:     Lack of Transportation (Medical): Not on file    Lack of Transportation (Non-Medical):  Not on file   Physical Activity:     Days of Exercise per Week: Not on file    Minutes of Exercise per Session: Not on file   Stress:     Feeling of Stress : Not on file   Social Connections:     Frequency of Communication with Friends and Family: Not on file    Frequency of Social Gatherings with Friends and Family: Not on file    Attends Yarsani Services: Not on file    Active Member of Clubs or Organizations: Not on file    Attends Club or Organization Meetings: Not on file    Marital Status: Not on file   Intimate Partner Violence:     Fear of Current or Ex-Partner: Not on file    Emotionally Abused: Not on file    Physically Abused: Not on file    Sexually Abused: Not on file   Housing Stability:     Unable to Pay for Housing in the Last Year: Not on file    Number of Places Lived in the Last Year: Not on file    Unstable Housing in the Last Year: Not on file        Family History   Problem Relation Age of Onset    Diabetes Mother         type 2    Heart Failure Brother         ulcer, killed in [facility]           Visit Vitals  BP (!) 121/59   Pulse 68   Temp 97.7 °F (36.5 °C) (Temporal)   Resp 18   Ht 5' 8\" (1.727 m)   Wt 193 lb 6.4 oz (87.7 kg)   SpO2 97%   BMI 29.41 kg/m²        Review of Systems   Eyes: Negative for blurred vision. Respiratory:        No change in breathing. Cardiovascular: Negative for chest pain, orthopnea and PND. Gastrointestinal: Negative for blood in stool. Genitourinary: Negative for hematuria. Neurological: Negative for headaches. Psychiatric/Behavioral: Negative for depression. The patient is not nervous/anxious. Physical Exam  Constitutional:       General: He is not in acute distress. Eyes:      General: No scleral icterus. Conjunctiva/sclera: Conjunctivae normal.   Neck:      Comments: Bilateral carotid upstrokes normal to palpation. Lymph: No cervical or supraclavicular lymphadenopathy. Cardiovascular:      Rate and Rhythm: Normal rate and regular rhythm. Pulses: Normal pulses. Heart sounds: Murmur heard. No friction rub. No gallop. Pulmonary:      Effort: Pulmonary effort is normal.      Breath sounds: Normal breath sounds. Abdominal:      Palpations: Abdomen is soft. There is no mass. Tenderness: There is no abdominal tenderness. Musculoskeletal:      Cervical back: Neck supple. Comments: No clubbing, no cyanosis. Minimal edema. Calves are nontender, no cords. Skin:     General: Skin is warm and dry. Neurological:      Mental Status: He is alert and oriented to person, place, and time. Psychiatric:         Mood and Affect: Mood normal.                  Diagnoses and all orders for this visit:    1.  Essential hypertension  Comments:  controlled, but decrease HCTZ to 12.5 mg daily to see if helps creatinine and calcium  Orders:  -     hydroCHLOROthiazide (HYDRODIURIL) 12.5 mg tablet; Take 1 Tablet by mouth daily. 2. Type 2 diabetes mellitus treated without insulin ([facility] [address]  Comments:  A1c in good range at 6.1%. States up-to-date eye exam.  No neuropathy on exam today    3. Hypercalcemia  Comments:  Possibly due to hydrochlorothiazide, decreased to 12.5 mg daily, check labs 6 weeks  Orders:  -     METABOLIC PANEL, BASIC; Future    4. Decreased GFR  Comments:  Try to drink more water instead of Pepsi, avoid NSAIDs, check with next labs. Decrease HCTZ as noted         Follow-up and Dispositions    · Return for bp, [facility]/ [facility], labs in 6 weeks non-fasting.               Valeriano Henriquez MD

## 2022-05-12 NOTE — PROGRESS NOTES
Tee Meneses presents today for   Chief Complaint   Patient presents with    Diabetes       Is someone accompanying this pt? Yes, grandauther    Is the patient using any DME equipment during OV? no    Depression Screening:  3 most recent PHQ Screens 5/12/2022   Little interest or pleasure in doing things Not at all   Feeling down, depressed, irritable, or hopeless Not at all   Total Score PHQ 2 0       Learning Assessment:  No flowsheet data found. Abuse Screening:  Abuse Screening Questionnaire 5/12/2022   Do you ever feel afraid of your partner? N   Are you in a relationship with someone who physically or mentally threatens you? N   Is it safe for you to go home? Y       Fall Risk  Fall Risk Assessment, last 12 mths 5/12/2022   Able to walk? Yes   Fall in past 12 months? 0   Do you feel unsteady? 0   Are you worried about falling 0       ADL  ADL Assessment 5/3/2021   Feeding yourself No Help Needed   Getting from bed to chair No Help Needed   Getting dressed No Help Needed   Bathing or showering No Help Needed   Walk across the room (includes cane/walker) No Help Needed   Using the telphone No Help Needed   Taking your medications No Help Needed   Preparing meals No Help Needed   Managing money (expenses/bills) No Help Needed   Moderately strenuous housework (laundry) No Help Needed   Shopping for personal items (toiletries/medicines) No Help Needed   Shopping for groceries No Help Needed   Driving No Help Needed   Climbing a flight of stairs No Help Needed   Getting to places beyond walking distances No Help Needed       Health Maintenance reviewed and discussed and ordered per Provider.     Health Maintenance Due   Topic Date Due    Pneumococcal 65+ years (1 - PCV) Never done    Foot Exam Q1  Never done    Eye Exam Retinal or Dilated  Never done    Shingrix Vaccine Age 50> (1 of 2) Never done    DTaP/Tdap/Td series (1 - Tdap) 01/21/2005    COVID-19 Vaccine (3 - Booster for Moderna series) 09/23/2021    MICROALBUMIN Q1  05/03/2022   .    1. \"Have you been to the ER, urgent care clinic since your last visit? Hospitalized since your last visit? \" No    2. \"Have you seen or consulted any other health care providers outside of the [address] [place] [address] since your last visit? \" No     3. For patients aged 39-70: Has the patient had a colonoscopy / FIT/ Cologuard? NA - based on age      If the patient is female:    4. For patients aged 41-77: Has the patient had a mammogram within the past 2 years? NA - based on age or sex      11. For patients aged 21-65: Has the patient had a pap smear?  NA - based on age or sex

## 2022-05-12 NOTE — TELEPHONE ENCOUNTER
Last Visit: today with MD Rubin  Next Appointment: 8/23/22 with MD Hanny Solis  Previous Refill Encounter(s): 7/21/21 #90 with 3 refills    Requested Prescriptions     Pending Prescriptions Disp Refills    rosuvastatin (CRESTOR) 10 mg tablet 90 Tablet 3     Sig: Take 1 Tablet by mouth daily.

## 2022-06-07 LAB — LDL CHOLESTEROL, EXTERNAL: 76

## 2022-06-13 ENCOUNTER — TELEPHONE (OUTPATIENT)
Dept: INTERNAL MEDICINE CLINIC | Age: 87
End: 2022-06-13

## 2022-06-13 NOTE — TELEPHONE ENCOUNTER
Camille Zelaya with [facility] called to let Dr. Lesli Nunez know that they received an order for home health for patient but there was a delay in initiation of care, and they are now scheduled to see Patient today 6/13/22. She can be reached at [phone] if needed.  Thank you

## 2022-06-17 ENCOUNTER — OFFICE VISIT (OUTPATIENT)
Dept: INTERNAL MEDICINE CLINIC | Age: 87
End: 2022-06-17
Payer: MEDICARE

## 2022-06-17 VITALS
DIASTOLIC BLOOD PRESSURE: 71 MMHG | HEART RATE: 67 BPM | TEMPERATURE: 98.1 F | WEIGHT: 190 LBS | RESPIRATION RATE: 18 BRPM | SYSTOLIC BLOOD PRESSURE: 138 MMHG | OXYGEN SATURATION: 100 % | HEIGHT: 68 IN | BODY MASS INDEX: 28.79 KG/M2

## 2022-06-17 DIAGNOSIS — Z09 HOSPITAL DISCHARGE FOLLOW-UP: Primary | ICD-10-CM

## 2022-06-17 DIAGNOSIS — J18.9 COMMUNITY ACQUIRED PNEUMONIA, UNSPECIFIED LATERALITY: ICD-10-CM

## 2022-06-17 DIAGNOSIS — I26.99 OTHER ACUTE PULMONARY EMBOLISM WITHOUT ACUTE COR PULMONALE (HCC): ICD-10-CM

## 2022-06-17 DIAGNOSIS — I10 ESSENTIAL HYPERTENSION: ICD-10-CM

## 2022-06-17 PROCEDURE — G0463 HOSPITAL OUTPT CLINIC VISIT: HCPCS | Performed by: INTERNAL MEDICINE

## 2022-06-17 PROCEDURE — 1101F PT FALLS ASSESS-DOCD LE1/YR: CPT | Performed by: INTERNAL MEDICINE

## 2022-06-17 PROCEDURE — G8427 DOCREV CUR MEDS BY ELIG CLIN: HCPCS | Performed by: INTERNAL MEDICINE

## 2022-06-17 PROCEDURE — G8510 SCR DEP NEG, NO PLAN REQD: HCPCS | Performed by: INTERNAL MEDICINE

## 2022-06-17 PROCEDURE — 1123F ACP DISCUSS/DSCN MKR DOCD: CPT | Performed by: INTERNAL MEDICINE

## 2022-06-17 PROCEDURE — G8536 NO DOC ELDER MAL SCRN: HCPCS | Performed by: INTERNAL MEDICINE

## 2022-06-17 PROCEDURE — 99214 OFFICE O/P EST MOD 30 MIN: CPT | Performed by: INTERNAL MEDICINE

## 2022-06-17 PROCEDURE — G8752 SYS BP LESS 140: HCPCS | Performed by: INTERNAL MEDICINE

## 2022-06-17 PROCEDURE — G8417 CALC BMI ABV UP PARAM F/U: HCPCS | Performed by: INTERNAL MEDICINE

## 2022-06-17 PROCEDURE — G8754 DIAS BP LESS 90: HCPCS | Performed by: INTERNAL MEDICINE

## 2022-06-17 RX ORDER — METOPROLOL SUCCINATE 25 MG/1
25 TABLET, EXTENDED RELEASE ORAL DAILY
Qty: 90 TABLET | Refills: 3 | Status: SHIPPED | OUTPATIENT
Start: 2022-06-17 | End: 2022-09-23 | Stop reason: DRUGHIGH

## 2022-06-17 RX ORDER — MONTELUKAST SODIUM 10 MG/1
10 TABLET ORAL DAILY
Qty: 90 TABLET | Refills: 3 | Status: SHIPPED | OUTPATIENT
Start: 2022-06-17

## 2022-06-17 NOTE — PROGRESS NOTES
Sina Nicholsno presents today for   Chief Complaint   Patient presents with   [facility] Follow Up       Is someone accompanying this pt? Yes, daughter    Is the patient using any DME equipment during OV? Yes, oxygen 3 liters    Depression Screening:  3 most recent PHQ Screens 6/17/2022   Little interest or pleasure in doing things Not at all   Feeling down, depressed, irritable, or hopeless Not at all   Total Score PHQ 2 0       Learning Assessment:  No flowsheet data found. Abuse Screening:  Abuse Screening Questionnaire 6/17/2022   Do you ever feel afraid of your partner? N   Are you in a relationship with someone who physically or mentally threatens you? N   Is it safe for you to go home? Y       Fall Risk  Fall Risk Assessment, last 12 mths 6/17/2022   Able to walk? Yes   Fall in past 12 months? 0   Do you feel unsteady? 0   Are you worried about falling 0       ADL  ADL Assessment 5/3/2021   Feeding yourself No Help Needed   Getting from bed to chair No Help Needed   Getting dressed No Help Needed   Bathing or showering No Help Needed   Walk across the room (includes cane/walker) No Help Needed   Using the telphone No Help Needed   Taking your medications No Help Needed   Preparing meals No Help Needed   Managing money (expenses/bills) No Help Needed   Moderately strenuous housework (laundry) No Help Needed   Shopping for personal items (toiletries/medicines) No Help Needed   Shopping for groceries No Help Needed   Driving No Help Needed   Climbing a flight of stairs No Help Needed   Getting to places beyond walking distances No Help Needed       Health Maintenance reviewed and discussed and ordered per Provider.     Health Maintenance Due   Topic Date Due    Pneumococcal 65+ years (1 - PCV) Never done    Foot Exam Q1  Never done    Eye Exam Retinal or Dilated  Never done    Shingrix Vaccine Age 50> (1 of 2) Never done    DTaP/Tdap/Td series (1 - Tdap) 01/21/2005    COVID-19 Vaccine (3 - Booster for Moderna series) 09/23/2021    MICROALBUMIN Q1  05/03/2022   .    1. \"Have you been to the ER, urgent care clinic since your last visit? Hospitalized since your last visit? \" yes,6-6 June [facility] [address] [facility]    2. \"Have you seen or consulted any other health care providers outside of the [address] [place] [address] since your last visit? \" No     3. For patients aged 39-70: Has the patient had a colonoscopy / FIT/ Cologuard? NA - based on age      If the patient is female:    4. For patients aged 41-77: Has the patient had a mammogram within the past 2 years? NA - based on age or sex      11. For patients aged 21-65: Has the patient had a pap smear?  NA - based on age or sex

## 2022-06-17 NOTE — PROGRESS NOTES
HPI     Nan Leung is here with his daughter after being hospitalized for dyspnea and diagnosed with pulmonary embolus and pneumonia. He is on Eliquis, and recently decreased this from 10 mg twice a day to 5 mg twice a day. He denies having a fever on presentation, but did feel chilled, dyspneic, and a little confused. He states this is back to his baseline. He initially had some blood-tinged sputum, this is improving. He had a little bit of loose stool, but that has resolved since finishing his course of antibiotics. He has no known family history of DVT or pulmonary embolus. He was checked for COVID and reported negative results several times. Prior to his pulmonary embolus, he was not particularly immobilized, had not had any long air or car travel. They were instructed to hold his hydrochlorothiazide and amlodipine upon hospital discharge due to relative hypotension. He continued his metoprolol, and resumed half of a hydrochlorothiazide tablet the last few days because of ankle swelling, which has since improved. He is wearing oxygen today, and states he wears it all night, and as needed during the day. 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 90 Tablet 3    metoprolol succinate (TOPROL-XL) 25 mg XL tablet Take 1 Tablet by mouth daily.  90 Tablet 3  hydroCHLOROthiazide (HYDRODIURIL) 12.5 mg tablet Take 1 Tablet by mouth daily. 90 Tablet 1    rosuvastatin (CRESTOR) 10 mg tablet Take 1 Tablet by mouth daily. 90 Tablet 3    potassium chloride (KLOR-CON M10) 10 mEq tablet Take 1 Tablet by mouth daily. 90 Tablet 1    nitroglycerin (NITROSTAT) 0.4 mg SL tablet 1 Tablet by SubLINGual route every five (5) minutes as needed for Chest Pain. Up to 3 doses. 25 Tablet 1    latanoprost (XALATAN) 0.005 % ophthalmic solution 1 Drop.  therapeutic multivitamin (THEREMS) tablet Take 1 Tab by mouth.  ALPHAGAN P 0.1 % ophthalmic solution INSTILL 1 DROP INTO BOTH EYES TWICE A DAY  4    fluticasone-salmeterol (ADVAIR DISKUS) 250-50 mcg/dose diskus inhaler Take 1 Puff by inhalation every twelve (12) hours.  tiotropium (SPIRIVA WITH HANDIHALER) 18 mcg inhalation capsule Take 1 Cap by inhalation daily.           Allergies   Allergen Reactions    Ace Inhibitors Anaphylaxis    Pravastatin Myalgia    Sulfa (Sulfonamide Antibiotics) Unknown (comments)        Social History     Socioeconomic History    Marital status:      Spouse name: Not on file    Number of children: Not on file    Years of education: Not on file    Highest education level: Not on file   Occupational History    Not on file   Tobacco Use    Smoking status: Former Smoker     Quit date: 3/15/2009     Years since quittin.2    Smokeless tobacco: Never Used   Vaping Use    Vaping Use: Never used   Substance and Sexual Activity    Alcohol use: No    Drug use: No    Sexual activity: Never   Other Topics Concern    Not on file   Social History Narrative    Not on file     Social Determinants of Health     Financial Resource Strain:     Difficulty of Paying Living Expenses: Not on file   Food Insecurity:     Worried About Running Out of Food in the Last Year: Not on file    Melly of Food in the Last Year: Not on file   Transportation Needs:     Lack of Transportation (Medical): Not on file    Lack of Transportation (Non-Medical): Not on file   Physical Activity:     Days of Exercise per Week: Not on file    Minutes of Exercise per Session: Not on file   Stress:     Feeling of Stress : Not on file   Social Connections:     Frequency of Communication with Friends and Family: Not on file    Frequency of Social Gatherings with Friends and Family: Not on file    Attends Christian Services: Not on file    Active Member of [address] [place] [address] [employer] or Organizations: Not on file    Attends Club or Organization Meetings: Not on file    Marital Status: Not on file   Intimate Partner Violence:     Fear of Current or Ex-Partner: Not on file    Emotionally Abused: Not on file    Physically Abused: Not on file    Sexually Abused: Not on file   Housing Stability:     Unable to Pay for Housing in the Last Year: Not on file    Number of [place] in the Last Year: Not on file    Unstable Housing in the Last Year: Not on file        Family History   Problem Relation Age of Onset    Diabetes Mother         type 2    Heart Failure Brother         ulcer, killed in [facility], [place] 60  /71 (BP 1 Location: Left upper arm, BP Patient Position: Sitting, BP Cuff Size: Large adult)   Pulse 67   Temp 98.1 °F (36.7 °C) (Temporal)   Resp 18   Ht 5' 8\" (1.727 m)   Wt 190 lb (86.2 kg)   SpO2 100%   BMI 28.89 kg/m²        Review of Systems   Constitutional: Negative for chills and fever. Respiratory: Positive for shortness of breath. Cardiovascular: Negative for chest pain, orthopnea and PND. Gastrointestinal: Negative for blood in stool. Genitourinary: Negative for dysuria and hematuria. Endo/Heme/Allergies:        He does have some bruising his arms, attributes to blood draw and IVs.       Physical Exam  Constitutional:       General: He is not in acute distress. Appearance: He is not toxic-appearing. Eyes:      General: No scleral icterus.      Conjunctiva/sclera: Conjunctivae normal.   Cardiovascular:      Rate and Rhythm: Normal rate and regular rhythm. Heart sounds: No friction rub. No gallop. Pulmonary:      Effort: Pulmonary effort is normal.   Abdominal:      Palpations: Abdomen is soft. Tenderness: There is no abdominal tenderness. Musculoskeletal:      Comments: No clubbing, cyanosis, or edema. Calves are nontender, no cords. Skin:     General: Skin is warm and dry. Neurological:      Mental Status: He is alert and oriented to person, place, and time. Psychiatric:         Mood and Affect: Mood normal.         Reviewed notes from hospitalist, lab reports, imaging reports prior to patient arrival            Diagnoses and all orders for this visit:    1. Hospital discharge follow-up    2. Essential hypertension  Comments:  Controlled, okay to stay off amlodipine. Monitor, continue one half HCTZ plus metoprolol  Orders:  -     metoprolol succinate (TOPROL-XL) 25 mg XL tablet; Take 1 Tablet by mouth daily. 3. Other acute pulmonary embolism without acute cor pulmonale (HCC)  Comments:  Continue on Eliquis for a total of 3 to 6 months. No obvious trigger    4. Community acquired pneumonia, unspecified laterality  Comments:  clinically improved, done with antibiotic therapy    Other orders  -     montelukast (SINGULAIR) 10 mg tablet; Take 1 Tablet by mouth daily. Follow-up and Dispositions    · Return for has appt-- cancel June lab visit.               Oliver Garcia MD

## 2022-06-28 ENCOUNTER — TELEPHONE (OUTPATIENT)
Dept: INTERNAL MEDICINE CLINIC | Age: 87
End: 2022-06-28

## 2022-07-01 ENCOUNTER — TELEPHONE (OUTPATIENT)
Dept: INTERNAL MEDICINE CLINIC | Age: 87
End: 2022-07-01

## 2022-07-01 DIAGNOSIS — I26.99 OTHER ACUTE PULMONARY EMBOLISM WITHOUT ACUTE COR PULMONALE (HCC): ICD-10-CM

## 2022-07-01 DIAGNOSIS — I10 ESSENTIAL HYPERTENSION: Primary | ICD-10-CM

## 2022-07-01 NOTE — TELEPHONE ENCOUNTER
Patient just got out of the hospital and is requesting a refill on Eliquis. He would like a 90 d/s sent to [facility]. Please advise and pend new Rx if appropriate.     Last visit:  6/17/22 with MD Gala Ohara  Next appt:  8/23/22 with MD Loomis 59 in place:    Recommendation Provided To:    Intervention Detail: New Rx: 1, reason: Patient Preference   Gap Closed?:    Intervention Accepted By:   Keila Zambrano Time Spent (min): 5

## 2022-08-23 ENCOUNTER — OFFICE VISIT (OUTPATIENT)
Dept: INTERNAL MEDICINE CLINIC | Age: 87
End: 2022-08-23
Payer: MEDICARE

## 2022-08-23 VITALS
BODY MASS INDEX: 28.19 KG/M2 | HEIGHT: 68 IN | SYSTOLIC BLOOD PRESSURE: 163 MMHG | DIASTOLIC BLOOD PRESSURE: 80 MMHG | HEART RATE: 61 BPM | WEIGHT: 186 LBS | RESPIRATION RATE: 18 BRPM | TEMPERATURE: 98.1 F | OXYGEN SATURATION: 97 %

## 2022-08-23 DIAGNOSIS — I10 ESSENTIAL HYPERTENSION: Primary | ICD-10-CM

## 2022-08-23 DIAGNOSIS — Z86.711 HISTORY OF PULMONARY EMBOLISM: ICD-10-CM

## 2022-08-23 DIAGNOSIS — R60.0 BILATERAL LOWER EXTREMITY EDEMA: ICD-10-CM

## 2022-08-23 PROBLEM — N18.30 CHRONIC RENAL DISEASE, STAGE III (HCC): Status: RESOLVED | Noted: 2022-05-12 | Resolved: 2022-08-23

## 2022-08-23 PROCEDURE — 1101F PT FALLS ASSESS-DOCD LE1/YR: CPT | Performed by: INTERNAL MEDICINE

## 2022-08-23 PROCEDURE — G0463 HOSPITAL OUTPT CLINIC VISIT: HCPCS | Performed by: INTERNAL MEDICINE

## 2022-08-23 PROCEDURE — G8536 NO DOC ELDER MAL SCRN: HCPCS | Performed by: INTERNAL MEDICINE

## 2022-08-23 PROCEDURE — G8753 SYS BP > OR = 140: HCPCS | Performed by: INTERNAL MEDICINE

## 2022-08-23 PROCEDURE — G8754 DIAS BP LESS 90: HCPCS | Performed by: INTERNAL MEDICINE

## 2022-08-23 PROCEDURE — 1123F ACP DISCUSS/DSCN MKR DOCD: CPT | Performed by: INTERNAL MEDICINE

## 2022-08-23 PROCEDURE — 99214 OFFICE O/P EST MOD 30 MIN: CPT | Performed by: INTERNAL MEDICINE

## 2022-08-23 PROCEDURE — G8510 SCR DEP NEG, NO PLAN REQD: HCPCS | Performed by: INTERNAL MEDICINE

## 2022-08-23 PROCEDURE — G8417 CALC BMI ABV UP PARAM F/U: HCPCS | Performed by: INTERNAL MEDICINE

## 2022-08-23 PROCEDURE — G8427 DOCREV CUR MEDS BY ELIG CLIN: HCPCS | Performed by: INTERNAL MEDICINE

## 2022-08-23 RX ORDER — HYDROCHLOROTHIAZIDE 25 MG/1
25 TABLET ORAL DAILY
Qty: 90 TABLET | Refills: 1 | Status: SHIPPED | OUTPATIENT
Start: 2022-08-23

## 2022-08-23 NOTE — PROGRESS NOTES
Faith Cosme presents today for   Chief Complaint   Patient presents with    Cholesterol Problem    Hypertension    Diabetes       Is someone accompanying this pt? Yes, daughter    Is the patient using any DME equipment during [address]? no    Depression Screening:  3 most recent PHQ Screens 8/23/2022   Little interest or pleasure in doing things Not at all   Feeling down, depressed, irritable, or hopeless Not at all   Total Score PHQ 2 0       Learning Assessment:  No flowsheet data found. Abuse Screening:  Abuse Screening Questionnaire 6/17/2022   Do you ever feel afraid of your partner? N   Are you in a relationship with someone who physically or mentally threatens you? N   Is it safe for you to go home? Y       Fall Risk  Fall Risk Assessment, last 12 mths 8/23/2022   Able to walk? Yes   Fall in past 12 months? 0   Do you feel unsteady? 0   Are you worried about falling 0       ADL  ADL Assessment 5/3/2021   Feeding yourself No Help Needed   Getting from bed to chair No Help Needed   Getting dressed No Help Needed   Bathing or showering No Help Needed   Walk across the room (includes cane/walker) No Help Needed   Using the telphone No Help Needed   Taking your medications No Help Needed   Preparing meals No Help Needed   Managing money (expenses/bills) No Help Needed   Moderately strenuous housework (laundry) No Help Needed   Shopping for personal items (toiletries/medicines) No Help Needed   Shopping for groceries No Help Needed   Driving No Help Needed   Climbing a flight of stairs No Help Needed   Getting to places beyond walking distances No Help Needed       Health Maintenance reviewed and discussed and ordered per Provider.     Health Maintenance Due   Topic Date Due    Foot Exam Q1  Never done    Eye Exam Retinal or Dilated  Never done    Shingrix Vaccine Age 50> (1 of 2) Never done    DTaP/Tdap/Td series (1 - Tdap) 01/21/2005    Pneumococcal 65+ years (2 - PPSV23 or PCV20) 10/11/2017    COVID-19 Vaccine (4 - Booster) 08/23/2021    MICROALBUMIN Q1  05/03/2022   . Coordination of Care:  1. Have you been to the ER, urgent care clinic since your last visit? Hospitalized since your last visit? no    2. Have you seen or consulted any other health care providers outside of the [address] [place] [address] since your last visit? Include any pap smears or colon screening. no    3. For patients aged 39-70: Has the patient had a colonoscopy? NA - based on age     If the patient is female:    4. For patients aged 41-77: Has the patient had a mammogram within the past 2 years? NA - based on age    11. For patients aged 21-65: Has the patient had a pap smear?  NA - based on age      [de-identified] 164/88 P 66  Standing 162/89 P 60

## 2022-08-24 NOTE — PROGRESS NOTES
HPI     Amelia Reed is here with his daughter for routine check. His chief issue is his COPD, for which she sees his pulmonologist.  He states he is compliant with his meds. When he does check his blood pressure it is usually in the 942O to 355N systolic. His lower extremity edema is fairly stable. It is better when he first wakes up, worsens as the day progresses. Denies the use of any NSAIDs, denies any obvious increased sodium intake. Continues on his Eliquis for pulmonary embolus diagnosed during a hospitalization for pneumonia 2 months ago. 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 90 Tablet 1    apixaban (ELIQUIS) 5 mg tablet Take 1 Tablet by mouth two (2) times a day. 180 Tablet 0    montelukast (SINGULAIR) 10 mg tablet Take 1 Tablet by mouth daily. 90 Tablet 3    metoprolol succinate (TOPROL-XL) 25 mg XL tablet Take 1 Tablet by mouth daily. 90 Tablet 3    rosuvastatin (CRESTOR) 10 mg tablet Take 1 Tablet by mouth daily. 90 Tablet 3    potassium chloride (KLOR-CON M10) 10 mEq tablet Take 1 Tablet by mouth daily. 90 Tablet 1    nitroglycerin (NITROSTAT) 0.4 mg SL tablet 1 Tablet by SubLINGual route every five (5) minutes as needed for Chest Pain. Up to 3 doses. 25 Tablet 1    latanoprost (XALATAN) 0.005 % ophthalmic solution 1 Drop.  therapeutic multivitamin (THERAGRAN) tablet Take 1 Tab by mouth.  ALPHAGAN P 0.1 % ophthalmic solution INSTILL 1 DROP INTO BOTH EYES TWICE A DAY  4    fluticasone propion-salmeteroL (ADVAIR/WIXELA) 250-50 mcg/dose diskus inhaler Take 1 Puff by inhalation every twelve (12) hours.  tiotropium (SPIRIVA) 18 mcg inhalation capsule Take 1 Cap by inhalation daily. Allergies   Allergen Reactions    Ace Inhibitors Anaphylaxis    Pravastatin Myalgia    Sulfa (Sulfonamide Antibiotics) Unknown (comments)        Social History     Socioeconomic History    Marital status:      Spouse name: Not on file    Number of children: Not on file    Years of education: Not on file    Highest education level: Not on file   Occupational History    Not on file   Tobacco Use    Smoking status: Former     Types: Cigarettes     Quit date: 3/15/2009     Years since quittin.4    Smokeless tobacco: Never   Vaping Use    Vaping Use: Never used   Substance and Sexual Activity    Alcohol use: No    Drug use: No    Sexual activity: Never   Other Topics Concern    Not on file   Social History Narrative    Not on file     Social Determinants of Health     Financial Resource Strain: Not on file   Food Insecurity: Not on file   Transportation Needs: Not on file   Physical Activity: Not on file   Stress: Not on file   Social Connections: Not on file   Intimate Partner Violence: Not on file   Housing Stability: Not on file        Family History   Problem Relation Age of Onset    Diabetes Mother         type 2    Heart Failure Brother         ulcer, killed in [facility] 60  BP (!) 163/80 (BP 1 Location: Left upper arm, BP Patient Position: Sitting, BP Cuff Size: Adult)   Pulse 61   Temp 98.1 °F (36.7 °C) (Temporal)   Resp 18   Ht 5' 8\" (1.727 m)   Wt 186 lb (84.4 kg)   SpO2 97%   BMI 28.28 kg/m²        Review of Systems   Constitutional:  Negative for chills and fever.    Eyes:  Negative for blurred vision. Respiratory:  Positive for shortness of breath. Cardiovascular:  Negative for chest pain, orthopnea and PND. Gastrointestinal:  Negative for blood in stool. Genitourinary:  Negative for hematuria. Neurological:  Negative for headaches. Psychiatric/Behavioral:  Negative for depression. The patient is not nervous/anxious. Physical Exam  Constitutional:       General: He is not in acute distress. Comments: Weight down 6 pounds in about 4 months. Neck:      Comments: Carotid upstrokes normal to palpation. No cervical or supraclavicular lymphadenopathy. Cardiovascular:      Rate and Rhythm: Normal rate and regular rhythm. Pulses: Normal pulses. Heart sounds: No friction rub. No gallop. Pulmonary:      Effort: Pulmonary effort is normal.      Breath sounds: Normal breath sounds. Comments: Speaks full sentences easily. Abdominal:      Palpations: Abdomen is soft. Tenderness: There is no abdominal tenderness. Musculoskeletal:      Cervical back: Neck supple. Comments: No clubbing, no cyanosis. There is 1+ bilateral lower extremity pitting edema. Calves nontender, no cords. Skin:     General: Skin is warm and dry. Neurological:      Mental Status: He is alert and oriented to person, place, and time. Psychiatric:         Mood and Affect: Mood normal.                Diagnoses and all orders for this visit:    1. Essential hypertension  Comments:  Uncontrolled, increase HCTZ to 25 mg daily, watch for increased urination, BMP 2 weeks. May also help edema  Orders:  -     METABOLIC PANEL, BASIC; Future  -     hydroCHLOROthiazide (HYDRODIURIL) 25 mg tablet; Take 1 Tablet by mouth daily. 2. Bilateral lower extremity edema  Comments:  Better in a.m., worsens as day progresses, likely some element of venous insufficiency. Wear more supportive socks, elevate legs, HCTZ may help  Orders:  -     METABOLIC PANEL, BASIC; Future    3.  History of pulmonary embolism  Comments:  Found during hospitalization for dyspnea, pneumonia. Started June 2022, continue until December 2022         Follow-up and Dispositions    Return in about 4 weeks (around 9/20/2022) for bp-- labs in 2 weeks.               Beverley Jansen MD

## 2022-09-09 ENCOUNTER — APPOINTMENT (OUTPATIENT)
Dept: INTERNAL MEDICINE CLINIC | Age: 87
End: 2022-09-09

## 2022-09-09 ENCOUNTER — HOSPITAL ENCOUNTER (OUTPATIENT)
Dept: LAB | Age: 87
Discharge: HOME | End: 2022-09-09
Payer: MEDICARE

## 2022-09-09 DIAGNOSIS — R60.0 BILATERAL LOWER EXTREMITY EDEMA: ICD-10-CM

## 2022-09-09 DIAGNOSIS — I10 ESSENTIAL HYPERTENSION: ICD-10-CM

## 2022-09-09 LAB
ANION GAP SERPL CALC-SCNC: 5 MMOL/L (ref 3–18)
BUN SERPL-MCNC: 12 MG/DL (ref 7–18)
BUN/CREAT SERPL: 11 (ref 12–20)
CALCIUM SERPL-MCNC: 10.4 MG/DL (ref 8.5–10.1)
CHLORIDE SERPL-SCNC: 103 MMOL/L (ref 100–111)
CO2 SERPL-SCNC: 35 MMOL/L (ref 21–32)
CREAT SERPL-MCNC: 1.11 MG/DL (ref 0.6–1.3)
GLUCOSE SERPL-MCNC: 95 MG/DL (ref 74–99)
POTASSIUM SERPL-SCNC: 3.5 MMOL/L (ref 3.5–5.5)
SODIUM SERPL-SCNC: 143 MMOL/L (ref 136–145)

## 2022-09-09 PROCEDURE — 80048 BASIC METABOLIC PNL TOTAL CA: CPT

## 2022-09-09 PROCEDURE — 36415 COLL VENOUS BLD VENIPUNCTURE: CPT

## 2022-09-23 ENCOUNTER — OFFICE VISIT (OUTPATIENT)
Dept: INTERNAL MEDICINE CLINIC | Age: 87
End: 2022-09-23
Payer: MEDICARE

## 2022-09-23 VITALS
RESPIRATION RATE: 18 BRPM | WEIGHT: 185 LBS | HEIGHT: 68 IN | DIASTOLIC BLOOD PRESSURE: 76 MMHG | TEMPERATURE: 97.2 F | OXYGEN SATURATION: 97 % | BODY MASS INDEX: 28.04 KG/M2 | HEART RATE: 64 BPM | SYSTOLIC BLOOD PRESSURE: 159 MMHG

## 2022-09-23 DIAGNOSIS — I10 ESSENTIAL HYPERTENSION: Primary | ICD-10-CM

## 2022-09-23 DIAGNOSIS — R49.0 HOARSENESS: ICD-10-CM

## 2022-09-23 DIAGNOSIS — R60.0 BILATERAL LOWER EXTREMITY EDEMA: ICD-10-CM

## 2022-09-23 DIAGNOSIS — Z23 NEEDS FLU SHOT: ICD-10-CM

## 2022-09-23 PROCEDURE — 99214 OFFICE O/P EST MOD 30 MIN: CPT | Performed by: INTERNAL MEDICINE

## 2022-09-23 PROCEDURE — 90694 VACC AIIV4 NO PRSRV 0.5ML IM: CPT | Performed by: INTERNAL MEDICINE

## 2022-09-23 PROCEDURE — G8536 NO DOC ELDER MAL SCRN: HCPCS | Performed by: INTERNAL MEDICINE

## 2022-09-23 PROCEDURE — 1101F PT FALLS ASSESS-DOCD LE1/YR: CPT | Performed by: INTERNAL MEDICINE

## 2022-09-23 PROCEDURE — G8417 CALC BMI ABV UP PARAM F/U: HCPCS | Performed by: INTERNAL MEDICINE

## 2022-09-23 PROCEDURE — G8754 DIAS BP LESS 90: HCPCS | Performed by: INTERNAL MEDICINE

## 2022-09-23 PROCEDURE — G8427 DOCREV CUR MEDS BY ELIG CLIN: HCPCS | Performed by: INTERNAL MEDICINE

## 2022-09-23 PROCEDURE — G8510 SCR DEP NEG, NO PLAN REQD: HCPCS | Performed by: INTERNAL MEDICINE

## 2022-09-23 PROCEDURE — 1123F ACP DISCUSS/DSCN MKR DOCD: CPT | Performed by: INTERNAL MEDICINE

## 2022-09-23 PROCEDURE — G0463 HOSPITAL OUTPT CLINIC VISIT: HCPCS | Performed by: INTERNAL MEDICINE

## 2022-09-23 PROCEDURE — G8753 SYS BP > OR = 140: HCPCS | Performed by: INTERNAL MEDICINE

## 2022-09-23 RX ORDER — METOPROLOL SUCCINATE 50 MG/1
50 TABLET, EXTENDED RELEASE ORAL DAILY
Qty: 90 TABLET | Refills: 2 | Status: SHIPPED | OUTPATIENT
Start: 2022-09-23

## 2022-09-23 RX ORDER — METOPROLOL SUCCINATE 50 MG/1
50 TABLET, EXTENDED RELEASE ORAL DAILY
Qty: 90 TABLET | Refills: 2 | Status: SHIPPED | OUTPATIENT
Start: 2022-09-23 | End: 2022-09-23 | Stop reason: CLARIF

## 2022-09-23 NOTE — PATIENT INSTRUCTIONS
Vaccine Information Statement    Influenza (Flu) Vaccine (Inactivated or Recombinant): What You Need to Know    Many vaccine information statements are available in Turkish and other languages. See www.immunize.org/vis. Hojas de información sobre vacunas están disponibles en español y en muchos otros idiomas. Visite www.immunize.org/vis. 1. Why get vaccinated? Influenza vaccine can prevent influenza (flu). Flu is a contagious disease that spreads around the [facility] every year, usually between October and May. Anyone can get the flu, but it is more dangerous for some people. Infants and young children, people 72 years and older, pregnant people, and people with certain health conditions or a weakened immune system are at greatest risk of flu complications. Pneumonia, bronchitis, sinus infections, and ear infections are examples of flu-related complications. If you have a medical condition, such as heart disease, cancer, or diabetes, flu can make it worse. Flu can cause fever and chills, sore throat, muscle aches, fatigue, cough, headache, and runny or stuffy nose. Some people may have vomiting and diarrhea, though this is more common in children than adults. In an average year, thousands of people in the [facility] die from flu, and many more are hospitalized. Flu vaccine prevents millions of illnesses and flu-related visits to the doctor each year. 2. Influenza vaccines     CDC recommends everyone 6 months and older get vaccinated every flu season. Children 6 months through 6years of age may need 2 doses during a single flu season. Everyone else needs only 1 dose each flu season. It takes about 2 weeks for protection to develop after vaccination. There are many flu viruses, and they are always changing. Each year a new flu vaccine is made to protect against the influenza viruses believed to be likely to cause disease in the upcoming flu season.  Even when the vaccine doesnt exactly match these viruses, it may still provide some protection. Influenza vaccine does not cause flu. Influenza vaccine may be given at the same time as other vaccines. 3. Talk with your health care provider    Tell your vaccination provider if the person getting the vaccine:  Has had an allergic reaction after a previous dose of influenza vaccine, or has any severe, life-threatening allergies   Has ever had Guillain-Barré Syndrome (also called GBS)    In some cases, your health care provider may decide to postpone influenza vaccination until a future visit. Influenza vaccine can be administered at any time during pregnancy. People who are or will be pregnant during influenza season should receive inactivated influenza vaccine. People with minor illnesses, such as a cold, may be vaccinated. People who are moderately or severely ill should usually wait until they recover before getting influenza vaccine. Your health care provider can give you more information. 4. Risks of a vaccine reaction    Soreness, redness, and swelling where the shot is given, fever, muscle aches, and headache can happen after influenza vaccination. There may be a very small increased risk of Guillain-Barré Syndrome (GBS) after inactivated influenza vaccine (the flu shot). Monet Cline children who get the flu shot along with pneumococcal vaccine (PCV13) and/or DTaP vaccine at the same time might be slightly more likely to have a seizure caused by fever. Tell your health care provider if a child who is getting flu vaccine has ever had a seizure. People sometimes faint after medical procedures, including vaccination. Tell your provider if you feel dizzy or have vision changes or ringing in the ears. As with any medicine, there is a very remote chance of a vaccine causing a severe allergic reaction, other serious injury, or death. 5. What if there is a serious problem?     An allergic reaction could occur after the vaccinated person leaves the clinic. If you see signs of a severe allergic reaction (hives, swelling of the face and throat, difficulty breathing, a fast heartbeat, dizziness, or weakness), call 9-1-1 and get the person to the nearest hospital.    For other signs that concern you, call your health care provider. Adverse reactions should be reported to the Vaccine Adverse Event Reporting System (VAERS). Your health care provider will usually file this report, or you can do it yourself. Visit the VAERS website at www.vaers. [facility].gov or call 3-493.609.4585. VAERS is only for reporting reactions, and VAERS staff members do not give medical advice. 6. The National Vaccine Injury Compensation Program    The [facility] Vaccine Injury Compensation Program (VICP) is a federal program that was created to compensate people who may have been injured by certain vaccines. Claims regarding alleged injury or death due to vaccination have a time limit for filing, which may be as short as two years. Visit the VICP website at www.[facility].gov/vaccinecompensation or call 9-203.169.1248 to learn about the program and about filing a claim. 7. How can I learn more? Ask your health care provider. Call your local or state health department. Visit the website of the Food and Drug Administration (FDA) for vaccine package inserts and additional information at www.fda.gov/vaccines-blood-biologics/vaccines. Contact the Centers for Disease Control and Prevention (CDC): Call 6-224.658.6705 (4-509-IGW-INFO) or  Visit CDCs influenza website at www.cdc.gov/flu. Vaccine Information Statement   Inactivated Influenza Vaccine   8/6/2021  42 ARMIN Cole 361WD-30   Department of Health and Human Services  Centers for Disease Control and Prevention    Office Use Only

## 2022-09-23 NOTE — PROGRESS NOTES
PEARL     Noreen Nunn is here with his daughter for follow-up of his hypertension. He denies any worsening of his breathing since addition of metoprolol, as a matter fact feels that his breathing is better, as long as he paces himself. He does notice increased urination on the HCTZ on questioning, but states it is tolerable. His blood pressure is better, but still systolic in the 345B as here today. He and his daughter noticed that he has been hoarse for a few months. He states he is very careful after using his steroid inhaler to gargle, spit, even brush his teeth. His throat does not hurt. He quit smoking about 13 years ago, after  a 50-pack-year smoking history. 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 90 Tablet 2    hydroCHLOROthiazide (HYDRODIURIL) 25 mg tablet Take 1 Tablet by mouth daily. 90 Tablet 1    apixaban (ELIQUIS) 5 mg tablet Take 1 Tablet by mouth two (2) times a day. 180 Tablet 0    montelukast (SINGULAIR) 10 mg tablet Take 1 Tablet by mouth daily. 90 Tablet 3    rosuvastatin (CRESTOR) 10 mg tablet Take 1 Tablet by mouth daily. 90 Tablet 3    potassium chloride (KLOR-CON M10) 10 mEq tablet Take 1 Tablet by mouth daily.  90 Tablet 1    nitroglycerin (NITROSTAT) 0.4 mg SL tablet 1 Tablet by SubLINGual route every five (5) minutes as needed for Chest Pain. Up to 3 doses. 25 Tablet 1    latanoprost (XALATAN) 0.005 % ophthalmic solution 1 Drop.  therapeutic multivitamin (THERAGRAN) tablet Take 1 Tab by mouth.  ALPHAGAN P 0.1 % ophthalmic solution INSTILL 1 DROP INTO BOTH EYES TWICE A DAY  4    fluticasone propion-salmeteroL (ADVAIR/WIXELA) 250-50 mcg/dose diskus inhaler Take 1 Puff by inhalation every twelve (12) hours.  tiotropium (SPIRIVA) 18 mcg inhalation capsule Take 1 Cap by inhalation daily.           Allergies   Allergen Reactions    Ace Inhibitors Anaphylaxis    Pravastatin Myalgia    Sulfa (Sulfonamide Antibiotics) Unknown (comments)        Social History     Socioeconomic History    Marital status:      Spouse name: Not on file    Number of children: Not on file    Years of education: Not on file    Highest education level: Not on file   Occupational History    Not on file   Tobacco Use    Smoking status: Former     Packs/day: 1.00     Years: 50.00     Pack years: 50.00     Types: Cigarettes     Quit date: 3/15/2009     Years since quittin.5    Smokeless tobacco: Never   Vaping Use    Vaping Use: Never used   Substance and Sexual Activity    Alcohol use: No    Drug use: No    Sexual activity: Never   Other Topics Concern    Not on file   Social History Narrative    Not on file     Social Determinants of Health     Financial Resource Strain: Not on file   Food Insecurity: Not on file   Transportation Needs: Not on file   Physical Activity: Not on file   Stress: Not on file   Social Connections: Not on file   Intimate Partner Violence: Not on file   Housing Stability: Not on file        Family History   Problem Relation Age of Onset    Diabetes Mother         type 2    Heart Failure Brother         [facility]  BP (!) 159/76 (BP 1 Location: Left upper arm, BP Patient Position: Sitting, BP Cuff Size: Adult)   Pulse 64   Temp 97.2 °F (36.2 °C) (Temporal)   Resp 18   Ht 5' 8\" (1.727 m)   Wt 185 lb (83.9 kg)   SpO2 97%   BMI 28.13 kg/m²        Review of Systems   Eyes:  Negative for blurred vision. Cardiovascular:  Negative for chest pain and orthopnea. Gastrointestinal:  Negative for blood in stool. Genitourinary:  Negative for hematuria. Neurological:  Negative for headaches. Endo/Heme/Allergies:  Does not bruise/bleed easily. Psychiatric/Behavioral:  Negative for depression. The patient is not nervous/anxious. Physical Exam  Constitutional:       General: He is not in acute distress. Appearance: He is not ill-appearing. HENT:      Mouth/Throat:      Mouth: Mucous membranes are moist.      Pharynx: No posterior oropharyngeal erythema. Comments: Uvula midline  Eyes:      General: No scleral icterus. Conjunctiva/sclera: Conjunctivae normal.   Neck:      Comments: Carotid upstrokes normal to palpation  Lymph: No cervical or supraclavicular lymphadenopathy. Cardiovascular:      Rate and Rhythm: Normal rate and regular rhythm. Heart sounds: Murmur heard. No friction rub. No gallop. Pulmonary:      Effort: Pulmonary effort is normal.      Breath sounds: Normal breath sounds. Abdominal:      Palpations: Abdomen is soft. Musculoskeletal:      Cervical back: Neck supple. Comments: Clubbing, no cyanosis, calves nontender, no cords. There is mild bilateral ankle edema, overall improved. Skin:     General: Skin is warm and dry. Neurological:      Mental Status: He is alert and oriented to person, place, and time. Psychiatric:         Mood and Affect: Mood normal.                Diagnoses and all orders for this visit:    1. Essential hypertension  Comments:  Improved, still uncontrolled, increase metoprolol ER to 50 mg daily. Report worsening of dyspnea, though discussed it is a cardioselective beta-blocker  Orders:  -     metoprolol succinate (TOPROL-XL) 50 mg XL tablet; Take 1 Tablet by mouth daily.     2. Hoarseness  Comments: In previous smoker, referred to ENT  Orders:  -     REFERRAL TO ENT-OTOLARYNGOLOGY    3. Bilateral lower extremity edema  Comments:  no other s/syxs volume overload. Improved since stopping Norvasc/starting HCTZ. Better in morning, worse at night= some venous insufficiency     4. Needs flu shot  -     INFLUENZA, FLUAD, (AGE 65 Y+), IM, PF, 0.5 ML         Follow-up and Dispositions    Return in about 6 weeks (around 11/4/2022) for bp/mcwl/acp.               Pineda Orr MD

## 2022-09-23 NOTE — PROGRESS NOTES
Jacquesalfredomike Wen presents today for   Chief Complaint   Patient presents with    Hypertension       Is someone accompanying this pt? no    Is the patient using any DME equipment during [address]? no    Depression Screening:  3 most recent PHQ Screens 9/23/2022   Little interest or pleasure in doing things Not at all   Feeling down, depressed, irritable, or hopeless Not at all   Total Score PHQ 2 0       Learning Assessment:  No flowsheet data found. Abuse Screening:  Abuse Screening Questionnaire 9/23/2022   Do you ever feel afraid of your partner? N   Are you in a relationship with someone who physically or mentally threatens you? N   Is it safe for you to go home? Y       Fall Risk  Fall Risk Assessment, last 12 mths 9/23/2022   Able to walk? Yes   Fall in past 12 months? 0   Do you feel unsteady? 0   Are you worried about falling 0       ADL  ADL Assessment 5/3/2021   Feeding yourself No Help Needed   Getting from bed to chair No Help Needed   Getting dressed No Help Needed   Bathing or showering No Help Needed   Walk across the room (includes cane/walker) No Help Needed   Using the telphone No Help Needed   Taking your medications No Help Needed   Preparing meals No Help Needed   Managing money (expenses/bills) No Help Needed   Moderately strenuous housework (laundry) No Help Needed   Shopping for personal items (toiletries/medicines) No Help Needed   Shopping for groceries No Help Needed   Driving No Help Needed   Climbing a flight of stairs No Help Needed   Getting to places beyond walking distances No Help Needed       Health Maintenance reviewed and discussed and ordered per Provider. Health Maintenance Due   Topic Date Due    Foot Exam Q1  Never done    Eye Exam Retinal or Dilated  Never done    Shingrix Vaccine Age 50> (1 of 2) Never done    DTaP/Tdap/Td series (1 - Tdap) 01/21/2005    Pneumococcal 65+ years (2 - PPSV23 or PCV20) 10/11/2017    MICROALBUMIN Q1  05/03/2022    Flu Vaccine (1) 08/01/2022   . Coordination of Care:  1. Have you been to the ER, urgent care clinic since your last visit? Hospitalized since your last visit? no    2. Have you seen or consulted any other health care providers outside of the [address] [place] [address] since your last visit? Include any pap smears or colon screening. no    3. For patients aged 39-70: Has the patient had a colonoscopy? NA - based on age     If the patient is female:    4. For patients aged 41-77: Has the patient had a mammogram within the past 2 years? NA - based on age    11. For patients aged 21-65: Has the patient had a pap smear? NA - based on age      Cassidy Toro is a 82 y.o. male who presents for routine immunizations. He denies any symptoms , reactions or allergies that would exclude them from being immunized today. Risks and adverse reactions were discussed and the VIS was given to them. All questions were addressed. He was observed for 5 min post injection. There were no reactions observed. Verbal order for Flu IM received per Humberto Hester MD for pt Cassidy Toro. Order written down and read back to provider for verification prior to completion.

## 2022-10-12 DIAGNOSIS — I26.99 OTHER ACUTE PULMONARY EMBOLISM WITHOUT ACUTE COR PULMONALE (HCC): ICD-10-CM

## 2022-10-12 NOTE — TELEPHONE ENCOUNTER
Patient is out of medication    Last Visit: 9/23/22 with MD Sherril Mcardle  Next Appointment: 11/7/22 with MD Sherril Mcardle  Previous Refill Encounter(s): 7/1/22 #180    Requested Prescriptions     Pending Prescriptions Disp Refills    apixaban (ELIQUIS) 5 mg tablet 180 Tablet 0     Sig: Take 1 Tablet by mouth two (2) times a day. For [address] [facility] in place:   Recommendation Provided To:    Intervention Detail: New Rx: 1, reason: Patient Preference  Gap Closed?:   Intervention Accepted By:   Time Spent (min): 5

## 2022-10-17 DIAGNOSIS — I10 ESSENTIAL HYPERTENSION: ICD-10-CM

## 2022-10-17 RX ORDER — POTASSIUM CHLORIDE 750 MG/1
10 TABLET, EXTENDED RELEASE ORAL DAILY
Qty: 90 TABLET | Refills: 3 | Status: SHIPPED | OUTPATIENT
Start: 2022-10-17

## 2022-10-17 NOTE — TELEPHONE ENCOUNTER
Last Visit: 9/23/22 with MD Sindhu Deras  Next Appointment: 11/7/22 with MD Sindhu Deras  Previous Refill Encounter(s): 5/5/22 #90 with 1 refill    Requested Prescriptions     Pending Prescriptions Disp Refills    potassium chloride (KLOR-CON M10) 10 mEq tablet 90 Tablet 3     Sig: Take 1 Tablet by mouth daily. For [address] [facility] in place:   Recommendation Provided To:    Intervention Detail: New Rx: 1, reason: Patient Preference  Gap Closed?:   Intervention Accepted By:   Time Spent (min): 5

## 2022-11-06 NOTE — PATIENT INSTRUCTIONS
Medicare Wellness Visit, Male    The best way to live healthy is to have a lifestyle where you eat a well-balanced diet, exercise regularly, limit alcohol use, and quit all forms of tobacco/nicotine, if applicable. Regular preventive services are another way to keep healthy. Preventive services (vaccines, screening tests, monitoring & exams) can help personalize your care plan, which helps you manage your own care. Screening tests can find health problems at the earliest stages, when they are easiest to treat. [facility] follows the current, evidence-based guidelines published by the [facility] Julio ([facility]) when recommending preventive services for our patients. Because we follow these guidelines, sometimes recommendations change over time as research supports it. (For example, a prostate screening blood test is no longer routinely recommended for men with no symptoms). Of course, you and your doctor may decide to screen more often for some diseases, based on your risk and co-morbidities (chronic disease you are already diagnosed with). Preventive services for you include:  - Medicare offers their members a free annual wellness visit, which is time for you and your primary care provider to discuss and plan for your preventive service needs. Take advantage of this benefit every year!  -All adults over age 72 should receive the recommended pneumonia vaccines. Current USPSTF guidelines recommend a series of two vaccines for the best pneumonia protection.   -All adults should have a flu vaccine yearly and tetanus vaccine every 10 years.  -All adults age 48 and older should receive the shingles vaccines (series of two vaccines).        -All adults age 38-68 who are overweight should have a diabetes screening test once every three years.   -Other screening tests & preventive services for persons with diabetes include: an eye exam to screen for diabetic retinopathy, a kidney function test, a foot exam, and stricter control over your cholesterol.   -Cardiovascular screening for adults with routine risk involves an electrocardiogram (ECG) at intervals determined by the provider.   -Colorectal cancer screening should be done for adults age 54-65 with no increased risk factors for colorectal cancer. There are a number of acceptable methods of screening for this type of cancer. Each test has its own benefits and drawbacks. Discuss with your provider what is most appropriate for you during your annual wellness visit. The different tests include: colonoscopy (considered the best screening method), a fecal occult blood test, a fecal DNA test, and sigmoidoscopy.  -All adults born between [facility] should be screened once for Hepatitis C.  -An Abdominal Aortic Aneurysm (AAA) Screening is recommended for men age 73-68 who has ever smoked in their lifetime. Here is a list of your current Health Maintenance items (your personalized list of preventive services) with a due date:  Health Maintenance Due   Topic Date Due    Pneumococcal Vaccine (1 - PCV) Never done    Eye Exam  Never done    Shingles Vaccine (1 of 2) Never done    DTaP/Tdap/Td  (1 - Tdap) 01/21/2005    Albumin Urine Test  05/03/2022    COVID-19 Vaccine (5 - Booster for Debbrah Neighbor series) 09/08/2022       Medicare Wellness Visit, Male    The best way to live healthy is to have a lifestyle where you eat a well-balanced diet, exercise regularly, limit alcohol use, and quit all forms of tobacco/nicotine, if applicable. Regular preventive services are another way to keep healthy. Preventive services (vaccines, screening tests, monitoring & exams) can help personalize your care plan, which helps you manage your own care. Screening tests can find health problems at the earliest stages, when they are easiest to treat.    [facility] follows the current, evidence-based guidelines published by the [facility] ([facility]) when recommending preventive services for our patients. Because we follow these guidelines, sometimes recommendations change over time as research supports it. (For example, a prostate screening blood test is no longer routinely recommended for men with no symptoms). Of course, you and your doctor may decide to screen more often for some diseases, based on your risk and co-morbidities (chronic disease you are already diagnosed with). Preventive services for you include:  - Medicare offers their members a free annual wellness visit, which is time for you and your primary care provider to discuss and plan for your preventive service needs. Take advantage of this benefit every year!  -All adults over age 72 should receive the recommended pneumonia vaccines. Current USPSTF guidelines recommend a series of two vaccines for the best pneumonia protection.   -All adults should have a flu vaccine yearly and tetanus vaccine every 10 years.  -All adults age 48 and older should receive the shingles vaccines (series of two vaccines). -All adults age 38-68 who are overweight should have a diabetes screening test once every three years.   -Other screening tests & preventive services for persons with diabetes include: an eye exam to screen for diabetic retinopathy, a kidney function test, a foot exam, and stricter control over your cholesterol.   -Cardiovascular screening for adults with routine risk involves an electrocardiogram (ECG) at intervals determined by the provider.   -Colorectal cancer screening should be done for adults age 54-65 with no increased risk factors for colorectal cancer. There are a number of acceptable methods of screening for this type of cancer. Each test has its own benefits and drawbacks. Discuss with your provider what is most appropriate for you during your annual wellness visit.  The different tests include: colonoscopy (considered the best screening method), a fecal occult blood test, a fecal DNA test, and sigmoidoscopy.  -All adults born between [facility][1] should be screened once for Hepatitis C.  -An Abdominal Aortic Aneurysm (AAA) Screening is recommended for men age 73-68 who has ever smoked in their lifetime.      Here is a list of your current Health Maintenance items (your personalized list of preventive services) with a due date:  Health Maintenance Due   Topic Date Due    Pneumococcal Vaccine (1 - PCV) Never done    Eye Exam  Never done    Shingles Vaccine (1 of 2) Never done    DTaP/Tdap/Td  (1 - Tdap) 01/21/2005    Albumin Urine Test  05/03/2022    COVID-19 Vaccine (5 - Booster for Moderna series) 09/08/2022

## 2022-11-06 NOTE — ACP (ADVANCE CARE PLANNING)
Advance Care Planning     Advance Care Planning (ACP) Physician/NP/PA Conversation      Date of Conversation: 11/7/2022  Conducted with: Patient with Decision Making Capacity    Healthcare Decision Maker:   No healthcare decision makers have been documented. Click here to complete [place] including selection of the Healthcare Decision Maker Relationship (ie \"Primary\")    Today we documented Decision Maker(s) consistent with Legal Next of Kin hierarchy. Care Preferences:    Hospitalization: \"If your health worsens and it becomes clear that your chance of recovery is unlikely, what would be your preference regarding hospitalization? \"  The patient would prefer hospitalization. Ventilation: \"If you were unable to breathe on your own and your chance of recovery was unlikely, what would be your preference about the use of a ventilator (breathing machine) if it was available to you? \"   The patient would NOT desire the use of a ventilator. Resuscitation: \"In the event your heart stopped as a result of an underlying serious health condition, would you want attempts to be made to restart your heart, or would you prefer a natural death? \"   No, do NOT attempt to resuscitate. Additional topics discussed: artificial nutrition    Conversation Outcomes / Follow-Up Plan:   ACP complete - no further action today  Reviewed DNR/DNI and patient confirms current DNR status - completed forms on file (place new order if needed)     Length of Voluntary ACP Conversation in minutes:  16 minutes    Cornelius Alcantar MD                    [address] (ACP) Physician/NP/PA Conversation      Date of Conversation: 11/7/2022  Conducted with: Patient with [address]:   No healthcare decision makers have been documented.    Click here to complete [place] including selection of the Healthcare Decision Maker Relationship (ie \"Primary\")        Care Preferences:    Hospitalization: \"If your health worsens and it becomes clear that your chance of recovery is unlikely, what would be your preference regarding hospitalization? \"  The patient would prefer hospitalization. Ventilation: \"If you were unable to breathe on your own and your chance of recovery was unlikely, what would be your preference about the use of a ventilator (breathing machine) if it was available to you? \"   The patient would NOT desire the use of a ventilator. Resuscitation: \"In the event your heart stopped as a result of an underlying serious health condition, would you want attempts to be made to restart your heart, or would you prefer a natural death? \"   No, do NOT attempt to resuscitate.     Additional topics discussed: artificial nutrition    Conversation Outcomes / Follow-Up Plan:   ACP complete - no further action today  Reviewed DNR/DNI and patient confirms current DNR status - completed forms on file (place new order if needed)     Length of Voluntary ACP Conversation in minutes:  16 minutes    Giacomo Gomez MD

## 2022-11-07 ENCOUNTER — OFFICE VISIT (OUTPATIENT)
Dept: INTERNAL MEDICINE CLINIC | Age: 87
End: 2022-11-07
Payer: MEDICARE

## 2022-11-07 ENCOUNTER — TELEPHONE (OUTPATIENT)
Dept: INTERNAL MEDICINE CLINIC | Age: 87
End: 2022-11-07

## 2022-11-07 VITALS
BODY MASS INDEX: 27.89 KG/M2 | DIASTOLIC BLOOD PRESSURE: 81 MMHG | RESPIRATION RATE: 18 BRPM | WEIGHT: 184 LBS | TEMPERATURE: 97.3 F | OXYGEN SATURATION: 98 % | HEIGHT: 68 IN | HEART RATE: 62 BPM | SYSTOLIC BLOOD PRESSURE: 139 MMHG

## 2022-11-07 DIAGNOSIS — J43.8 OTHER EMPHYSEMA (HCC): ICD-10-CM

## 2022-11-07 DIAGNOSIS — I10 ESSENTIAL HYPERTENSION: ICD-10-CM

## 2022-11-07 DIAGNOSIS — Z00.00 MEDICARE ANNUAL WELLNESS VISIT, SUBSEQUENT: ICD-10-CM

## 2022-11-07 DIAGNOSIS — Z71.89 ADVANCED DIRECTIVES, COUNSELING/DISCUSSION: Primary | ICD-10-CM

## 2022-11-07 PROCEDURE — G8752 SYS BP LESS 140: HCPCS | Performed by: INTERNAL MEDICINE

## 2022-11-07 PROCEDURE — 3074F SYST BP LT 130 MM HG: CPT | Performed by: INTERNAL MEDICINE

## 2022-11-07 PROCEDURE — 99213 OFFICE O/P EST LOW 20 MIN: CPT | Performed by: INTERNAL MEDICINE

## 2022-11-07 PROCEDURE — 3078F DIAST BP <80 MM HG: CPT | Performed by: INTERNAL MEDICINE

## 2022-11-07 PROCEDURE — 1101F PT FALLS ASSESS-DOCD LE1/YR: CPT | Performed by: INTERNAL MEDICINE

## 2022-11-07 PROCEDURE — 99497 ADVNCD CARE PLAN 30 MIN: CPT | Performed by: INTERNAL MEDICINE

## 2022-11-07 PROCEDURE — G8536 NO DOC ELDER MAL SCRN: HCPCS | Performed by: INTERNAL MEDICINE

## 2022-11-07 PROCEDURE — G8510 SCR DEP NEG, NO PLAN REQD: HCPCS | Performed by: INTERNAL MEDICINE

## 2022-11-07 PROCEDURE — G0439 PPPS, SUBSEQ VISIT: HCPCS | Performed by: INTERNAL MEDICINE

## 2022-11-07 PROCEDURE — G8427 DOCREV CUR MEDS BY ELIG CLIN: HCPCS | Performed by: INTERNAL MEDICINE

## 2022-11-07 PROCEDURE — 1123F ACP DISCUSS/DSCN MKR DOCD: CPT | Performed by: INTERNAL MEDICINE

## 2022-11-07 PROCEDURE — G0463 HOSPITAL OUTPT CLINIC VISIT: HCPCS | Performed by: INTERNAL MEDICINE

## 2022-11-07 PROCEDURE — G8417 CALC BMI ABV UP PARAM F/U: HCPCS | Performed by: INTERNAL MEDICINE

## 2022-11-07 PROCEDURE — G8754 DIAS BP LESS 90: HCPCS | Performed by: INTERNAL MEDICINE

## 2022-11-07 NOTE — PROGRESS NOTES
HPI      is here for his Medicare wellness/advanced care planning visit. 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 I advised him he can stop his medication in December. Denies any new family history. He does have some dyspnea on exertion, but no dyspnea at rest.  This is stable from last visit. 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 90 Tablet 3    apixaban (ELIQUIS) 5 mg tablet 1 po bid Until around December 5, then stop 100 Tablet 0    metoprolol succinate (TOPROL-XL) 50 mg XL tablet Take 1 Tablet by mouth daily. 90 Tablet 2    hydroCHLOROthiazide (HYDRODIURIL) 25 mg tablet Take 1 Tablet by mouth daily. 90 Tablet 1    montelukast (SINGULAIR) 10 mg tablet Take 1 Tablet by mouth daily. 90 Tablet 3    rosuvastatin (CRESTOR) 10 mg tablet Take 1 Tablet by mouth daily. 90 Tablet 3    nitroglycerin (NITROSTAT) 0.4 mg SL tablet 1 Tablet by SubLINGual route every five (5) minutes as needed for Chest Pain. Up to 3 doses. 25 Tablet 1    latanoprost (XALATAN) 0.005 % ophthalmic solution 1 Drop. therapeutic multivitamin (THERAGRAN) tablet Take 1 Tab by mouth.       ALPHAGAN P 0.1 % ophthalmic solution INSTILL 1 DROP INTO BOTH EYES TWICE A DAY  4    fluticasone propion-salmeteroL (ADVAIR/WIXELA) 250-50 mcg/dose diskus inhaler Take 1 Puff by inhalation every twelve (12) hours. tiotropium (SPIRIVA) 18 mcg inhalation capsule Take 1 Cap by inhalation daily. Allergies   Allergen Reactions    Ace Inhibitors Anaphylaxis    Pravastatin Myalgia    Sulfa (Sulfonamide Antibiotics) Unknown (comments)        Social History     Socioeconomic History    Marital status:      Spouse name: Not on file    Number of children: Not on file    Years of education: Not on file    Highest education level: Not on file   Occupational History    Not on file   Tobacco Use    Smoking status: Former     Packs/day: 1.00     Years: 50.00     Pack years: 50.00     Types: Cigarettes     Quit date: 3/15/2009     Years since quittin.6    Smokeless tobacco: Never   Vaping Use    Vaping Use: Never used   Substance and Sexual Activity    Alcohol use: No    Drug use: No    Sexual activity: Never   Other Topics Concern    Not on file   Social History Narrative    Not on file     Social Determinants of Health     Financial Resource Strain: Not on file   Food Insecurity: Not on file   Transportation Needs: Not on file   Physical Activity: Not on file   Stress: Not on file   Social Connections: Not on file   Intimate Partner Violence: Not on file   Housing Stability: Not on file        Family History   Problem Relation Age of Onset    Diabetes Mother         type 2    Heart Failure Brother         ulcer, killed in [facility] 60  /81 (BP 1 Location: Right upper arm, BP Patient Position: Sitting, BP Cuff Size: Large adult)   Pulse 62   Temp 97.3 °F (36.3 °C) (Temporal)   Resp 18   Ht 5' 8\" (1.727 m)   Wt 184 lb (83.5 kg)   SpO2 98%   BMI 27.98 kg/m²        Review of Systems   Constitutional:  Negative for fever. Eyes:  Negative for blurred vision. Respiratory:          No pleuritic chest pain. Cardiovascular:  Negative for chest pain, orthopnea and PND. Gastrointestinal:  Negative for blood in stool. Neurological:  Negative for headaches. Psychiatric/Behavioral:  Negative for depression. The patient is not nervous/anxious. Physical Exam  Constitutional:       General: He is not in acute distress. HENT:      Right Ear: Tympanic membrane, ear canal and external ear normal.      Left Ear: Tympanic membrane, ear canal and external ear normal.   Eyes:      General: No scleral icterus. Conjunctiva/sclera: Conjunctivae normal.   Neck:      Comments: Carotid upstrokes normal to palpation. Lymph: No cervical or supraclavicular lymphadenopathy. Cardiovascular:      Rate and Rhythm: Normal rate and regular rhythm. Heart sounds: Murmur heard. No friction rub. No gallop. Pulmonary:      Effort: Pulmonary effort is normal.      Breath sounds: Normal breath sounds. Abdominal:      Palpations: Abdomen is soft. Tenderness: There is no abdominal tenderness. Musculoskeletal:      Cervical back: Neck supple. Comments: No clubbing, no cyanosis. Calves nontender, no cords. There is mild bilateral ankle edema, unchanged. Skin:     General: Skin is warm and dry. Neurological:      Mental Status: He is alert and oriented to person, place, and time. Psychiatric:         Mood and Affect: Mood normal.                Diagnoses and all orders for this visit:    1. Advanced directives, counseling/discussion  -     ADVANCE CARE PLANNING FIRST 30 MINS    2. Medicare annual wellness visit, subsequent       Follow-up and Dispositions    Return in about 6 months (around 5/7/2023) for bp. Marga Flores MD   This is the Subsequent Medicare Annual Wellness Exam, performed 12 months or more after the Initial AWV or the last Subsequent AWV    I have reviewed the patient's medical history in detail and updated the computerized patient record. Assessment/Plan   Education and counseling provided:  Are appropriate based on today's review and evaluation    1.  Advanced directives, counseling/discussion  -     ADVANCE CARE PLANNING FIRST 30 MINS  2. Medicare annual wellness visit, subsequent       Depression Risk Factor Screening     3 most recent PHQ Screens 11/7/2022   Little interest or pleasure in doing things Not at all   Feeling down, depressed, irritable, or hopeless Not at all   Total Score PHQ 2 0       Alcohol & Drug Abuse Risk Screen    Do you average more than 1 drink per night or more than 7 drinks a week: No    In the past three months have you have had more than 4 drinks containing alcohol on one occasion: No          Functional Ability and Level of Safety    Hearing: Hearing is good. Activities of Daily Living: The home contains: grab bars  Patient does total self care      Ambulation: with no difficulty     Fall Risk:  Fall Risk Assessment, last 12 mths 11/7/2022   Able to walk? Yes   Fall in past 12 months? 0   Do you feel unsteady?  0   Are you worried about falling 0      Abuse Screen:  Patient is not abused       Cognitive Screening    Has your family/caregiver stated any concerns about your memory: no     Cognitive Screening: Normal - Mini Cog Test    Health Maintenance Due     Health Maintenance Due   Topic Date Due    Pneumococcal 65+ years (1 - PCV) Never done    Eye Exam Retinal or Dilated  Never done    Shingrix Vaccine Age 50> (1 of 2) Never done    DTaP/Tdap/Td series (1 - Tdap) 01/21/2005    MICROALBUMIN Q1  05/03/2022    COVID-19 Vaccine (5 - Booster for [place] Saravanan series) 09/08/2022       Patient Care Team   Patient Care Team:  Nicho Ryan MD as PCP - General (Internal Medicine Physician)  Nihco Ryan MD as PCP - [facility] Empaneled Provider  Rodriguez Irby DO as Physician (Internal Medicine Physician)  Freida Roche MD as Consulting Provider (Radiology)    History     Patient Active Problem List   Diagnosis Code    HTN (hypertension) I10    Hypercholesterolemia E78.00    CVA (cerebral infarction) I63.9    Emphysema J43.9    Renal lesion, left N28.9    Renal cell carcinoma, left (HCC) C64.2    Lightheadedness R42    Cerebral hemorrhage (HCC) I61.9    Type 2 diabetes mellitus (HCC) E11.9     Past Medical History:   Diagnosis Date    Adrenal mass ([address]     Cerebral hemorrhage (HCC)     due to aneurysm    COPD (chronic obstructive pulmonary disease) (HCC)     CVA (cerebral infarction) 1/29/2010    Emphysema 1/29/2010    HTN (hypertension) 1/29/2010    Hypercholesterolemia     Right renal mass     Solitary pulmonary nodule     Type 2 diabetes mellitus (HCC)       Past Surgical History:   Procedure Laterality Date    HX HERNIA REPAIR      lt inguinal    HX OTHER SURGICAL      INTRACRANIAL ANEURYSM REPAIR    HX TONSILLECTOMY       Current Outpatient Medications   Medication Sig Dispense Refill    potassium chloride (KLOR-CON M10) 10 mEq tablet Take 1 Tablet by mouth daily. 90 Tablet 3    apixaban (ELIQUIS) 5 mg tablet 1 po bid Until around December 5, then stop 100 Tablet 0    metoprolol succinate (TOPROL-XL) 50 mg XL tablet Take 1 Tablet by mouth daily. 90 Tablet 2    hydroCHLOROthiazide (HYDRODIURIL) 25 mg tablet Take 1 Tablet by mouth daily. 90 Tablet 1    montelukast (SINGULAIR) 10 mg tablet Take 1 Tablet by mouth daily. 90 Tablet 3    rosuvastatin (CRESTOR) 10 mg tablet Take 1 Tablet by mouth daily. 90 Tablet 3    nitroglycerin (NITROSTAT) 0.4 mg SL tablet 1 Tablet by SubLINGual route every five (5) minutes as needed for Chest Pain. Up to 3 doses. 25 Tablet 1    latanoprost (XALATAN) 0.005 % ophthalmic solution 1 Drop. therapeutic multivitamin (THERAGRAN) tablet Take 1 Tab by mouth. ALPHAGAN P 0.1 % ophthalmic solution INSTILL 1 DROP INTO BOTH EYES TWICE A DAY  4    fluticasone propion-salmeteroL (ADVAIR/WIXELA) 250-50 mcg/dose diskus inhaler Take 1 Puff by inhalation every twelve (12) hours. tiotropium (SPIRIVA) 18 mcg inhalation capsule Take 1 Cap by inhalation daily.        Allergies   Allergen Reactions    Ace Inhibitors Anaphylaxis Pravastatin Myalgia    Sulfa (Sulfonamide Antibiotics) Unknown (comments)       Family History   Problem Relation Age of Onset    Diabetes Mother         type 2    Heart Failure Brother         ulcer, killed in [facility]     Social History     Tobacco Use    Smoking status: Former     Packs/day: 1.00     Years: 50.00     Pack years: 50.00     Types: Cigarettes     Quit date: 3/15/2009     Years since quittin.6    Smokeless tobacco: Never   Substance Use Topics    Alcohol use: No         Claudia Carrion MD

## 2022-11-07 NOTE — PROGRESS NOTES
Tawanda Inman presents today for   Chief Complaint   Patient presents with    Annual Wellness Visit       Is someone accompanying this pt? no    Is the patient using any DME equipment during OV? no    Depression Screening:  3 most recent PHQ Screens 11/7/2022   Little interest or pleasure in doing things Not at all   Feeling down, depressed, irritable, or hopeless Not at all   Total Score PHQ 2 0       Learning Assessment:  No flowsheet data found. Abuse Screening:  Abuse Screening Questionnaire 11/7/2022   Do you ever feel afraid of your partner? N   Are you in a relationship with someone who physically or mentally threatens you? N   Is it safe for you to go home? Y       Fall Risk  Fall Risk Assessment, last 12 mths 11/7/2022   Able to walk? Yes   Fall in past 12 months? 0   Do you feel unsteady? 0   Are you worried about falling 0       ADL  ADL Assessment 5/3/2021   Feeding yourself No Help Needed   Getting from bed to chair No Help Needed   Getting dressed No Help Needed   Bathing or showering No Help Needed   Walk across the room (includes cane/walker) No Help Needed   Using the telphone No Help Needed   Taking your medications No Help Needed   Preparing meals No Help Needed   Managing money (expenses/bills) No Help Needed   Moderately strenuous housework (laundry) No Help Needed   Shopping for personal items (toiletries/medicines) No Help Needed   Shopping for groceries No Help Needed   Driving No Help Needed   Climbing a flight of stairs No Help Needed   Getting to places beyond walking distances No Help Needed       Health Maintenance reviewed and discussed and ordered per Provider.     Health Maintenance Due   Topic Date Due    Pneumococcal 65+ years (1 - PCV) Never done    Eye Exam Retinal or Dilated  Never done    Shingrix Vaccine Age 50> (1 of 2) Never done    DTaP/Tdap/Td series (1 - Tdap) 01/21/2005    MICROALBUMIN Q1  05/03/2022    COVID-19 Vaccine (5 - Booster for Jaqui Shazia series) 09/08/2022 Medicare Yearly Exam  11/05/2022   . Coordination of Care:  1. Have you been to the ER, urgent care clinic since your last visit? Hospitalized since your last visit? no    2. Have you seen or consulted any other health care providers outside of the [address] [place] [address] since your last visit? Include any pap smears or colon screening. no    3. For patients aged 39-70: Has the patient had a colonoscopy? NA - based on age     If the patient is female:    4. For patients aged 41-77: Has the patient had a mammogram within the past 2 years? NA - based on age    11. For patients aged 21-65: Has the patient had a pap smear?  NA - based on age

## 2022-11-07 NOTE — TELEPHONE ENCOUNTER
At check out pt says he forgot to ask for an handicapped sticker application for his car. Please fill out and we will mail to him.

## 2022-11-08 ENCOUNTER — TELEPHONE (OUTPATIENT)
Dept: INTERNAL MEDICINE CLINIC | Age: 87
End: 2022-11-08

## 2022-11-18 RX ORDER — ROSUVASTATIN CALCIUM 10 MG/1
10 TABLET, COATED ORAL DAILY
Qty: 90 TABLET | Refills: 3 | Status: SHIPPED | OUTPATIENT
Start: 2022-11-18

## 2022-11-30 NOTE — TELEPHONE ENCOUNTER
Daughter Argenis Vaughan calling asking if doctor can sign a new form and call her to pick it up.  They never got the one that was mailed [phone]

## 2023-03-22 ENCOUNTER — TELEPHONE (OUTPATIENT)
Age: 88
End: 2023-03-22

## 2023-03-22 DIAGNOSIS — I10 ESSENTIAL (PRIMARY) HYPERTENSION: Primary | ICD-10-CM

## 2023-03-22 DIAGNOSIS — J43.8 OTHER EMPHYSEMA (HCC): ICD-10-CM

## 2023-03-22 DIAGNOSIS — E78.00 PURE HYPERCHOLESTEROLEMIA, UNSPECIFIED: ICD-10-CM

## 2023-03-22 RX ORDER — HYDROCHLOROTHIAZIDE 25 MG/1
25 TABLET ORAL DAILY
Qty: 90 TABLET | Refills: 3 | Status: SHIPPED | OUTPATIENT
Start: 2023-03-22

## 2023-03-22 RX ORDER — ROSUVASTATIN CALCIUM 10 MG/1
10 TABLET, COATED ORAL DAILY
Qty: 90 TABLET | Refills: 3 | Status: SHIPPED | OUTPATIENT
Start: 2023-03-22

## 2023-03-22 RX ORDER — METOPROLOL SUCCINATE 50 MG/1
50 TABLET, EXTENDED RELEASE ORAL DAILY
Qty: 90 TABLET | Refills: 3 | Status: SHIPPED | OUTPATIENT
Start: 2023-03-22

## 2023-03-22 RX ORDER — MONTELUKAST SODIUM 10 MG/1
10 TABLET ORAL DAILY
Qty: 90 TABLET | Refills: 3 | Status: SHIPPED | OUTPATIENT
Start: 2023-03-22

## 2023-03-22 NOTE — TELEPHONE ENCOUNTER
Patient wants to start getting these from [address] [place] [address]. rosuvastatin (CRESTOR) 10 mg tablet 90 Tablet 3 11/18/2022     Sig - Route: Take 1 Tablet by mouth daily. - Oral    Sent to pharmacy as: rosuvastatin 10 mg tablet (CRESTOR)    E-Prescribing Status: Receipt confirmed by pharmacy (11/18/2022 12:48 PM EST)      metoprolol succinate (TOPROL-XL) 50 mg XL tablet 90 Tablet 2 9/23/2022     Sig - Route: Take 1 Tablet by mouth daily. - Oral    Sent to pharmacy as: metoprolol succinate ER 50 mg tablet,extended release 24 hr (TOPROL-XL)    E-Prescribing Status: Receipt confirmed by pharmacy (9/23/2022  3:47 PM EDT)      montelukast (SINGULAIR) 10 mg tablet 90 Tablet 3 6/17/2022     Sig - Route: Take 1 Tablet by mouth daily. - Oral    Sent to pharmacy as: montelukast 10 mg tablet (SINGULAIR)    E-Prescribing Status: Receipt confirmed by pharmacy (6/17/2022  2:29 PM EDT)      hydroCHLOROthiazide (HYDRODIURIL) 25 mg tablet 90 Tablet 1 8/23/2022     Sig - Route:  Take 1 Tablet by mouth daily. - Oral    Sent to pharmacy as: hydroCHLOROthiazide 25 mg tablet (HYDRODIURIL)    E-Prescribing Status: Receipt confirmed by pharmacy (8/23/2022 12:24 PM EDT)

## 2023-03-28 ENCOUNTER — OFFICE VISIT (OUTPATIENT)
Age: 88
End: 2023-03-28
Payer: MEDICARE

## 2023-03-28 VITALS
HEIGHT: 68 IN | BODY MASS INDEX: 28.19 KG/M2 | RESPIRATION RATE: 18 BRPM | DIASTOLIC BLOOD PRESSURE: 81 MMHG | WEIGHT: 186 LBS | HEART RATE: 69 BPM | SYSTOLIC BLOOD PRESSURE: 157 MMHG | TEMPERATURE: 98.9 F | OXYGEN SATURATION: 97 %

## 2023-03-28 DIAGNOSIS — M54.16 ACUTE LEFT LUMBAR RADICULOPATHY: Primary | ICD-10-CM

## 2023-03-28 DIAGNOSIS — I26.99 OTHER PULMONARY EMBOLISM WITHOUT ACUTE COR PULMONALE, UNSPECIFIED CHRONICITY (HCC): ICD-10-CM

## 2023-03-28 DIAGNOSIS — R10.9 FLANK PAIN: ICD-10-CM

## 2023-03-28 DIAGNOSIS — E11.9 TYPE 2 DIABETES MELLITUS WITHOUT COMPLICATION, WITHOUT LONG-TERM CURRENT USE OF INSULIN (HCC): ICD-10-CM

## 2023-03-28 DIAGNOSIS — I10 HYPERTENSION, UNSPECIFIED TYPE: ICD-10-CM

## 2023-03-28 PROBLEM — J18.9 PNEUMONIA DUE TO INFECTIOUS ORGANISM: Status: ACTIVE | Noted: 2022-06-06

## 2023-03-28 PROBLEM — R06.03 RESPIRATORY DISTRESS: Status: ACTIVE | Noted: 2022-06-06

## 2023-03-28 PROBLEM — M65.30 TRIGGERING OF DIGIT: Status: ACTIVE | Noted: 2019-06-12

## 2023-03-28 LAB
BILIRUBIN, URINE, POC: NEGATIVE
BLOOD URINE, POC: NEGATIVE
GLUCOSE URINE, POC: NEGATIVE
KETONES, URINE, POC: NEGATIVE
LEUKOCYTE ESTERASE, URINE, POC: NEGATIVE
NITRITE, URINE, POC: NEGATIVE
PH, URINE, POC: 7 (ref 4.6–8)
PROTEIN,URINE, POC: NEGATIVE
SPECIFIC GRAVITY, URINE, POC: 1.02 (ref 1–1.03)
URINALYSIS CLARITY, POC: CLEAR
URINALYSIS COLOR, POC: YELLOW
UROBILINOGEN, POC: NORMAL

## 2023-03-28 PROCEDURE — 1123F ACP DISCUSS/DSCN MKR DOCD: CPT | Performed by: INTERNAL MEDICINE

## 2023-03-28 PROCEDURE — G8427 DOCREV CUR MEDS BY ELIG CLIN: HCPCS | Performed by: INTERNAL MEDICINE

## 2023-03-28 PROCEDURE — 99214 OFFICE O/P EST MOD 30 MIN: CPT | Performed by: INTERNAL MEDICINE

## 2023-03-28 PROCEDURE — G8417 CALC BMI ABV UP PARAM F/U: HCPCS | Performed by: INTERNAL MEDICINE

## 2023-03-28 PROCEDURE — 3077F SYST BP >= 140 MM HG: CPT | Performed by: INTERNAL MEDICINE

## 2023-03-28 PROCEDURE — G8484 FLU IMMUNIZE NO ADMIN: HCPCS | Performed by: INTERNAL MEDICINE

## 2023-03-28 PROCEDURE — 81003 URINALYSIS AUTO W/O SCOPE: CPT | Performed by: INTERNAL MEDICINE

## 2023-03-28 PROCEDURE — PBSHW AMB POC URINALYSIS DIP STICK AUTO W/O MICRO: Performed by: INTERNAL MEDICINE

## 2023-03-28 PROCEDURE — 3078F DIAST BP <80 MM HG: CPT | Performed by: INTERNAL MEDICINE

## 2023-03-28 PROCEDURE — 1036F TOBACCO NON-USER: CPT | Performed by: INTERNAL MEDICINE

## 2023-03-28 RX ORDER — ACETAMINOPHEN 500 MG
TABLET ORAL EVERY EVENING
COMMUNITY

## 2023-03-28 RX ORDER — PREDNISONE 10 MG/1
TABLET ORAL
COMMUNITY
Start: 2023-01-01

## 2023-03-28 RX ORDER — AMLODIPINE BESYLATE 10 MG/1
TABLET ORAL SEE ADMIN INSTRUCTIONS
COMMUNITY
Start: 2022-06-08

## 2023-03-28 RX ORDER — ALBUTEROL SULFATE 90 UG/1
2 AEROSOL, METERED RESPIRATORY (INHALATION)
COMMUNITY
Start: 2018-11-07

## 2023-03-28 RX ORDER — METHYLPREDNISOLONE 4 MG/1
TABLET ORAL
Qty: 1 KIT | Refills: 0 | Status: SHIPPED | OUTPATIENT
Start: 2023-03-28

## 2023-03-28 ASSESSMENT — ENCOUNTER SYMPTOMS
SHORTNESS OF BREATH: 0
BACK PAIN: 1

## 2023-03-28 ASSESSMENT — PATIENT HEALTH QUESTIONNAIRE - PHQ9
SUM OF ALL RESPONSES TO PHQ QUESTIONS 1-9: 0
SUM OF ALL RESPONSES TO PHQ9 QUESTIONS 1 & 2: 0
1. LITTLE INTEREST OR PLEASURE IN DOING THINGS: 0
2. FEELING DOWN, DEPRESSED OR HOPELESS: 0
SUM OF ALL RESPONSES TO PHQ QUESTIONS 1-9: 0

## 2023-03-28 NOTE — PROGRESS NOTES
Christina Cassidy presents today for No chief complaint on file. 1. \"Have you been to the ER, urgent care clinic since your last visit? yes [address]  Hospitalized since your last visit? \" no    2. \"Have you seen or consulted any other health care providers outside of the [address] [place] [address] since your last visit? \" no     3. For patients aged 39-70: Has the patient had a colonoscopy / FIT/ Cologuard? NA - based on age      If the patient is female:    4. For patients aged 41-77: Has the patient had a mammogram within the past 2 years? NA - based on age or sex      11. For patients aged 21-65: Has the patient had a pap smear?  NA - based on age or sex

## 2023-03-29 NOTE — PROGRESS NOTES
Flank Pain  Pertinent negatives include no chest pain, dysuria, fever or headaches. Evens Gregory is here with about 1 week of left-sided back pain which radiates down his leg. It does not bother him as much during the day, but it becomes severe at night and interferes with sleep. He has tried Tylenol, rubbing his leg at night, but the pain persists. He had a biopsy of a left renal lesion about 2 weeks ago, and reports that they had him in a prone position during the procedure. He also thinks he might of \"pulled something\" on his right lateral abdominal area. No other GI symptoms associated with this, and it is somewhat positional.  He reports he is compliant with his blood pressure medications, has not been checking his blood pressure at home regularly, and I asked him to start doing so.       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    COPD (chronic obstructive pulmonary disease) (HCC)     CVA (cerebral infarction) 1/29/2010    Emphysema 1/29/2010    HTN (hypertension) 1/29/2010    Hypercholesterolemia     Right renal mass     Solitary pulmonary nodule     Type 2 diabetes mellitus (HCC)         Past Surgical History:   Procedure Laterality Date    HERNIA REPAIR      lt inguinal    OTHER SURGICAL HISTORY      INTRACRANIAL ANEURYSM REPAIR    TONSILLECTOMY          Current Outpatient Medications   Medication Sig Dispense Refill    predniSONE (DELTASONE) 10 MG tablet PLEASE SEE ATTACHED FOR DETAILED DIRECTIONS      glycerin-hypromellose- 0.2-0.2-1 % SOLN opthalmic solution 1 drop 2 times daily      amLODIPine (NORVASC) 10 MG tablet Take by mouth See Admin Instructions      albuterol sulfate HFA (PROVENTIL;VENTOLIN;PROAIR) 108 (90 Base) MCG/ACT inhaler Inhale 2 puffs into the lungs      acetaminophen (TYLENOL) 500 MG tablet Take by mouth every evening      Multiple Vitamin (THERAPEUTIC MULTIVITAMIN PO) Take 1 tablet by mouth

## 2023-04-07 ENCOUNTER — TELEPHONE (OUTPATIENT)
Age: 88
End: 2023-04-07

## 2023-04-07 DIAGNOSIS — M54.42 ACUTE LEFT-SIDED LOW BACK PAIN WITH LEFT-SIDED SCIATICA: Primary | ICD-10-CM

## 2023-04-07 RX ORDER — GABAPENTIN 100 MG/1
100 CAPSULE ORAL NIGHTLY PRN
Qty: 30 CAPSULE | Refills: 0 | Status: SHIPPED | OUTPATIENT
Start: 2023-04-07 | End: 2023-05-07

## 2023-04-07 NOTE — TELEPHONE ENCOUNTER
Received call from patient's daughter. Reports patient treated with Medrol dose pack for left sided low back pain with left sciatica on 3/28/2023 and noted some improvement with treatment but pain persists. Reports continued low back pain with prominent left leg pain and states having difficulty sleeping at night. Notes worsening pain when sitting. Discussed options and will begin gabapentin 100 mg to try at bedtime with two Tylenol ES to help with sleep. Also will proceed with physical therapy. Referral placed for [facility]. Advised to call back if no improvement.  reviewed. Gabapentin sent to [facility] [address].

## 2023-05-08 ENCOUNTER — OFFICE VISIT (OUTPATIENT)
Age: 88
End: 2023-05-08
Payer: MEDICARE

## 2023-05-08 VITALS
BODY MASS INDEX: 27.89 KG/M2 | HEART RATE: 87 BPM | WEIGHT: 184 LBS | SYSTOLIC BLOOD PRESSURE: 126 MMHG | DIASTOLIC BLOOD PRESSURE: 74 MMHG | TEMPERATURE: 98.9 F | HEIGHT: 68 IN | OXYGEN SATURATION: 95 % | RESPIRATION RATE: 18 BRPM

## 2023-05-08 DIAGNOSIS — R41.3 MEMORY LOSS: ICD-10-CM

## 2023-05-08 DIAGNOSIS — E11.9 TYPE 2 DIABETES MELLITUS WITHOUT COMPLICATION, WITHOUT LONG-TERM CURRENT USE OF INSULIN (HCC): ICD-10-CM

## 2023-05-08 DIAGNOSIS — E78.00 HYPERCHOLESTEROLEMIA: ICD-10-CM

## 2023-05-08 DIAGNOSIS — J43.8 OTHER EMPHYSEMA (HCC): ICD-10-CM

## 2023-05-08 DIAGNOSIS — M54.42 ACUTE LEFT-SIDED LOW BACK PAIN WITH LEFT-SIDED SCIATICA: ICD-10-CM

## 2023-05-08 DIAGNOSIS — I10 ESSENTIAL (PRIMARY) HYPERTENSION: Primary | ICD-10-CM

## 2023-05-08 PROCEDURE — 1123F ACP DISCUSS/DSCN MKR DOCD: CPT | Performed by: INTERNAL MEDICINE

## 2023-05-08 PROCEDURE — G8427 DOCREV CUR MEDS BY ELIG CLIN: HCPCS | Performed by: INTERNAL MEDICINE

## 2023-05-08 PROCEDURE — 99211 OFF/OP EST MAY X REQ PHY/QHP: CPT

## 2023-05-08 PROCEDURE — 1036F TOBACCO NON-USER: CPT | Performed by: INTERNAL MEDICINE

## 2023-05-08 PROCEDURE — G8417 CALC BMI ABV UP PARAM F/U: HCPCS | Performed by: INTERNAL MEDICINE

## 2023-05-08 PROCEDURE — 3023F SPIROM DOC REV: CPT | Performed by: INTERNAL MEDICINE

## 2023-05-08 PROCEDURE — 3074F SYST BP LT 130 MM HG: CPT | Performed by: INTERNAL MEDICINE

## 2023-05-08 PROCEDURE — 3078F DIAST BP <80 MM HG: CPT | Performed by: INTERNAL MEDICINE

## 2023-05-08 PROCEDURE — 99213 OFFICE O/P EST LOW 20 MIN: CPT | Performed by: INTERNAL MEDICINE

## 2023-05-08 ASSESSMENT — PATIENT HEALTH QUESTIONNAIRE - PHQ9
SUM OF ALL RESPONSES TO PHQ QUESTIONS 1-9: 0
SUM OF ALL RESPONSES TO PHQ9 QUESTIONS 1 & 2: 0
1. LITTLE INTEREST OR PLEASURE IN DOING THINGS: 0
SUM OF ALL RESPONSES TO PHQ QUESTIONS 1-9: 0
2. FEELING DOWN, DEPRESSED OR HOPELESS: 0

## 2023-05-08 ASSESSMENT — ENCOUNTER SYMPTOMS
BLOOD IN STOOL: 0
BACK PAIN: 1

## 2023-05-08 NOTE — PROGRESS NOTES
Dillon Qureshi presents today for No chief complaint on file. 1. \"Have you been to the ER, urgent care clinic since your last visit? Hospitalized since your last visit? \" no    2. \"Have you seen or consulted any other health care providers outside of the [address] [place] [address] since your last visit? \" no     3. For patients aged 39-70: Has the patient had a colonoscopy / FIT/ Cologuard? NA - based on age      If the patient is female:    4. For patients aged 41-77: Has the patient had a mammogram within the past 2 years? NA - based on age or sex      11. For patients aged 21-65: Has the patient had a pap smear?  NA - based on age or sex

## 2023-05-09 NOTE — PROGRESS NOTES
Hypertension  Pertinent negatives include no chest pain or headaches. Massimo Nino is here with his daughter for routine check. I gave him oral steroids after his last visit for left lumbar radiculopathy. The pain recurred, and my colleague prescribed a low-dose of gabapentin at night. He still taking this, wonders if he still needs it. I recommended taking every other night for the next 10 days, and I know if he wishes for me to refill for nightly use. Denies any adverse effects. He and his daughter note that his memory is not what it used to be. He sometimes asks the same questions a few times, loses objects, forgets what he is going to say but later remembers it. He is well aware of this. He eats a well-rounded diet, nothing restrictive. He sleeps at least 9 to 10 hours nightly, and they do not believe he snores. The last biopsy of his left renal area did not show any areas of malignancy, he will be followed up with imaging by urology.   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    COPD (chronic obstructive pulmonary disease) (HCC)     CVA (cerebral infarction) 1/29/2010    Emphysema 1/29/2010    HTN (hypertension) 1/29/2010    Hypercholesterolemia     Right renal mass     Solitary pulmonary nodule     Type 2 diabetes mellitus (HCC)         Past Surgical History:   Procedure Laterality Date    HERNIA REPAIR      lt inguinal    OTHER SURGICAL HISTORY      INTRACRANIAL ANEURYSM REPAIR    TONSILLECTOMY          Current Outpatient Medications   Medication Sig Dispense Refill    predniSONE (DELTASONE) 10 MG tablet PLEASE SEE ATTACHED FOR DETAILED DIRECTIONS      glycerin-hypromellose- 0.2-0.2-1 % SOLN opthalmic solution 1 drop 2 times daily      amLODIPine (NORVASC) 10 MG tablet Take by mouth See Admin Instructions      albuterol sulfate HFA (PROVENTIL;VENTOLIN;PROAIR) 108 (90 Base) MCG/ACT inhaler Inhale 2 puffs into the

## 2023-07-27 ENCOUNTER — TELEPHONE (OUTPATIENT)
Age: 88
End: 2023-07-27

## 2023-07-27 NOTE — TELEPHONE ENCOUNTER
Patient sister called in stating that the patient has red bumps on leg with drainage and it could possible be shingles. Please Advise

## 2023-07-28 ENCOUNTER — OFFICE VISIT (OUTPATIENT)
Age: 88
End: 2023-07-28
Payer: MEDICARE

## 2023-07-28 ENCOUNTER — HOSPITAL ENCOUNTER (OUTPATIENT)
Facility: HOSPITAL | Age: 88
End: 2023-07-28
Payer: MEDICARE

## 2023-07-28 VITALS
TEMPERATURE: 98.7 F | HEIGHT: 68 IN | HEART RATE: 55 BPM | WEIGHT: 186.5 LBS | DIASTOLIC BLOOD PRESSURE: 78 MMHG | OXYGEN SATURATION: 96 % | RESPIRATION RATE: 18 BRPM | BODY MASS INDEX: 28.26 KG/M2 | SYSTOLIC BLOOD PRESSURE: 160 MMHG

## 2023-07-28 DIAGNOSIS — B35.3 TINEA PEDIS OF BOTH FEET: ICD-10-CM

## 2023-07-28 DIAGNOSIS — I10 ESSENTIAL (PRIMARY) HYPERTENSION: ICD-10-CM

## 2023-07-28 DIAGNOSIS — R73.9 HYPERGLYCEMIA: ICD-10-CM

## 2023-07-28 DIAGNOSIS — B35.1 ONYCHOMYCOSIS: ICD-10-CM

## 2023-07-28 DIAGNOSIS — R21 RASH: ICD-10-CM

## 2023-07-28 DIAGNOSIS — R21 RASH: Primary | ICD-10-CM

## 2023-07-28 PROBLEM — J18.9 PNEUMONIA DUE TO INFECTIOUS ORGANISM: Status: RESOLVED | Noted: 2022-06-06 | Resolved: 2023-07-28

## 2023-07-28 LAB
ALBUMIN SERPL-MCNC: 3.5 G/DL (ref 3.4–5)
ALBUMIN/GLOB SERPL: 1 (ref 0.8–1.7)
ALP SERPL-CCNC: 75 U/L (ref 45–117)
ALT SERPL-CCNC: 22 U/L (ref 16–61)
ANION GAP SERPL CALC-SCNC: 6 MMOL/L (ref 3–18)
AST SERPL-CCNC: 27 U/L (ref 10–38)
BASOPHILS # BLD: 0 K/UL (ref 0–0.1)
BASOPHILS NFR BLD: 1 % (ref 0–2)
BILIRUB SERPL-MCNC: 0.4 MG/DL (ref 0.2–1)
BUN SERPL-MCNC: 9 MG/DL (ref 7–18)
BUN/CREAT SERPL: 8 (ref 12–20)
CALCIUM SERPL-MCNC: 10.5 MG/DL (ref 8.5–10.1)
CHLORIDE SERPL-SCNC: 103 MMOL/L (ref 100–111)
CHOLEST SERPL-MCNC: 153 MG/DL
CO2 SERPL-SCNC: 34 MMOL/L (ref 21–32)
CREAT SERPL-MCNC: 1.17 MG/DL (ref 0.6–1.3)
CREAT UR-MCNC: 158 MG/DL (ref 30–125)
CRP SERPL-MCNC: 0.5 MG/DL (ref 0–0.3)
DIFFERENTIAL METHOD BLD: ABNORMAL
EOSINOPHIL # BLD: 0.3 K/UL (ref 0–0.4)
EOSINOPHIL NFR BLD: 4 % (ref 0–5)
ERYTHROCYTE [DISTWIDTH] IN BLOOD BY AUTOMATED COUNT: 17 % (ref 11.6–14.5)
EST. AVERAGE GLUCOSE BLD GHB EST-MCNC: 128 MG/DL
GLOBULIN SER CALC-MCNC: 3.6 G/DL (ref 2–4)
GLUCOSE SERPL-MCNC: 112 MG/DL (ref 74–99)
HBA1C MFR BLD: 6.1 % (ref 4.2–5.6)
HCT VFR BLD AUTO: 42.6 % (ref 36–48)
HDLC SERPL-MCNC: 43 MG/DL (ref 40–60)
HDLC SERPL: 3.6 (ref 0–5)
HGB BLD-MCNC: 13.2 G/DL (ref 13–16)
IMM GRANULOCYTES # BLD AUTO: 0 K/UL (ref 0–0.04)
IMM GRANULOCYTES NFR BLD AUTO: 0 % (ref 0–0.5)
LDLC SERPL CALC-MCNC: 85.6 MG/DL (ref 0–100)
LIPID PANEL: NORMAL
LYMPHOCYTES # BLD: 2.5 K/UL (ref 0.9–3.6)
LYMPHOCYTES NFR BLD: 39 % (ref 21–52)
MCH RBC QN AUTO: 26.9 PG (ref 24–34)
MCHC RBC AUTO-ENTMCNC: 31 G/DL (ref 31–37)
MCV RBC AUTO: 86.9 FL (ref 78–100)
MICROALBUMIN UR-MCNC: 1.64 MG/DL (ref 0–3)
MICROALBUMIN/CREAT UR-RTO: 10 MG/G (ref 0–30)
MONOCYTES # BLD: 0.7 K/UL (ref 0.05–1.2)
MONOCYTES NFR BLD: 11 % (ref 3–10)
NEUTS SEG # BLD: 2.9 K/UL (ref 1.8–8)
NEUTS SEG NFR BLD: 46 % (ref 40–73)
NRBC # BLD: 0 K/UL (ref 0–0.01)
NRBC BLD-RTO: 0 PER 100 WBC
PLATELET # BLD AUTO: 237 K/UL (ref 135–420)
PMV BLD AUTO: 10.9 FL (ref 9.2–11.8)
POTASSIUM SERPL-SCNC: 3.4 MMOL/L (ref 3.5–5.5)
PROT SERPL-MCNC: 7.1 G/DL (ref 6.4–8.2)
RBC # BLD AUTO: 4.9 M/UL (ref 4.35–5.65)
SODIUM SERPL-SCNC: 143 MMOL/L (ref 136–145)
TRIGL SERPL-MCNC: 122 MG/DL
VLDLC SERPL CALC-MCNC: 24.4 MG/DL
WBC # BLD AUTO: 6.4 K/UL (ref 4.6–13.2)

## 2023-07-28 PROCEDURE — 80053 COMPREHEN METABOLIC PANEL: CPT

## 2023-07-28 PROCEDURE — 86140 C-REACTIVE PROTEIN: CPT

## 2023-07-28 PROCEDURE — 3078F DIAST BP <80 MM HG: CPT | Performed by: INTERNAL MEDICINE

## 2023-07-28 PROCEDURE — 1036F TOBACCO NON-USER: CPT | Performed by: INTERNAL MEDICINE

## 2023-07-28 PROCEDURE — G8427 DOCREV CUR MEDS BY ELIG CLIN: HCPCS | Performed by: INTERNAL MEDICINE

## 2023-07-28 PROCEDURE — 82043 UR ALBUMIN QUANTITATIVE: CPT

## 2023-07-28 PROCEDURE — 36415 COLL VENOUS BLD VENIPUNCTURE: CPT

## 2023-07-28 PROCEDURE — 99211 OFF/OP EST MAY X REQ PHY/QHP: CPT | Performed by: INTERNAL MEDICINE

## 2023-07-28 PROCEDURE — 80061 LIPID PANEL: CPT

## 2023-07-28 PROCEDURE — 85025 COMPLETE CBC W/AUTO DIFF WBC: CPT

## 2023-07-28 PROCEDURE — 83036 HEMOGLOBIN GLYCOSYLATED A1C: CPT

## 2023-07-28 PROCEDURE — 3074F SYST BP LT 130 MM HG: CPT | Performed by: INTERNAL MEDICINE

## 2023-07-28 PROCEDURE — 82570 ASSAY OF URINE CREATININE: CPT

## 2023-07-28 PROCEDURE — 1123F ACP DISCUSS/DSCN MKR DOCD: CPT | Performed by: INTERNAL MEDICINE

## 2023-07-28 PROCEDURE — 99214 OFFICE O/P EST MOD 30 MIN: CPT | Performed by: INTERNAL MEDICINE

## 2023-07-28 PROCEDURE — G8417 CALC BMI ABV UP PARAM F/U: HCPCS | Performed by: INTERNAL MEDICINE

## 2023-07-28 RX ORDER — FLUCONAZOLE 150 MG/1
150 TABLET ORAL
Qty: 6 TABLET | Refills: 0 | Status: SHIPPED | OUTPATIENT
Start: 2023-07-28 | End: 2023-09-02

## 2023-07-28 RX ORDER — CLOTRIMAZOLE 1 %
CREAM (GRAM) TOPICAL
Qty: 113 G | Refills: 5 | Status: SHIPPED | OUTPATIENT
Start: 2023-07-28 | End: 2023-08-04

## 2023-07-28 RX ORDER — CEPHALEXIN 500 MG/1
500 CAPSULE ORAL 4 TIMES DAILY
Qty: 28 CAPSULE | Refills: 0 | Status: SHIPPED | OUTPATIENT
Start: 2023-07-28 | End: 2023-08-04

## 2023-07-28 SDOH — ECONOMIC STABILITY: FOOD INSECURITY: WITHIN THE PAST 12 MONTHS, THE FOOD YOU BOUGHT JUST DIDN'T LAST AND YOU DIDN'T HAVE MONEY TO GET MORE.: NEVER TRUE

## 2023-07-28 SDOH — ECONOMIC STABILITY: FOOD INSECURITY: WITHIN THE PAST 12 MONTHS, YOU WORRIED THAT YOUR FOOD WOULD RUN OUT BEFORE YOU GOT MONEY TO BUY MORE.: NEVER TRUE

## 2023-07-28 SDOH — ECONOMIC STABILITY: HOUSING INSECURITY
IN THE LAST 12 MONTHS, WAS THERE A TIME WHEN YOU DID NOT HAVE A STEADY PLACE TO SLEEP OR SLEPT IN A SHELTER (INCLUDING NOW)?: NO

## 2023-07-28 SDOH — ECONOMIC STABILITY: INCOME INSECURITY: HOW HARD IS IT FOR YOU TO PAY FOR THE VERY BASICS LIKE FOOD, HOUSING, MEDICAL CARE, AND HEATING?: NOT HARD AT ALL

## 2023-08-03 ASSESSMENT — ENCOUNTER SYMPTOMS
ANAL BLEEDING: 0
ABDOMINAL PAIN: 0
COUGH: 0
BLOOD IN STOOL: 0
SORE THROAT: 0
EYE PAIN: 0
SHORTNESS OF BREATH: 0

## 2023-08-08 RX ORDER — POTASSIUM CHLORIDE 20 MEQ/1
20 TABLET, EXTENDED RELEASE ORAL DAILY
Qty: 90 TABLET | Refills: 3 | Status: SHIPPED | OUTPATIENT
Start: 2023-08-08

## 2023-08-22 ENCOUNTER — OFFICE VISIT (OUTPATIENT)
Age: 88
End: 2023-08-22
Payer: MEDICARE

## 2023-08-22 VITALS
TEMPERATURE: 98.6 F | HEART RATE: 53 BPM | HEIGHT: 68 IN | SYSTOLIC BLOOD PRESSURE: 151 MMHG | OXYGEN SATURATION: 96 % | BODY MASS INDEX: 27.68 KG/M2 | WEIGHT: 182.6 LBS | RESPIRATION RATE: 16 BRPM | DIASTOLIC BLOOD PRESSURE: 76 MMHG

## 2023-08-22 DIAGNOSIS — I10 ESSENTIAL (PRIMARY) HYPERTENSION: ICD-10-CM

## 2023-08-22 DIAGNOSIS — R21 RASH: Primary | ICD-10-CM

## 2023-08-22 PROCEDURE — G8417 CALC BMI ABV UP PARAM F/U: HCPCS | Performed by: INTERNAL MEDICINE

## 2023-08-22 PROCEDURE — 3078F DIAST BP <80 MM HG: CPT | Performed by: INTERNAL MEDICINE

## 2023-08-22 PROCEDURE — 1123F ACP DISCUSS/DSCN MKR DOCD: CPT | Performed by: INTERNAL MEDICINE

## 2023-08-22 PROCEDURE — 99214 OFFICE O/P EST MOD 30 MIN: CPT | Performed by: INTERNAL MEDICINE

## 2023-08-22 PROCEDURE — 3077F SYST BP >= 140 MM HG: CPT | Performed by: INTERNAL MEDICINE

## 2023-08-22 PROCEDURE — G8428 CUR MEDS NOT DOCUMENT: HCPCS | Performed by: INTERNAL MEDICINE

## 2023-08-22 PROCEDURE — 1036F TOBACCO NON-USER: CPT | Performed by: INTERNAL MEDICINE

## 2023-08-22 RX ORDER — METHYLPREDNISOLONE 4 MG/1
TABLET ORAL
Qty: 1 KIT | Refills: 0 | Status: SHIPPED | OUTPATIENT
Start: 2023-08-22

## 2023-08-22 ASSESSMENT — PATIENT HEALTH QUESTIONNAIRE - PHQ9
1. LITTLE INTEREST OR PLEASURE IN DOING THINGS: 0
SUM OF ALL RESPONSES TO PHQ9 QUESTIONS 1 & 2: 0
SUM OF ALL RESPONSES TO PHQ QUESTIONS 1-9: 0
2. FEELING DOWN, DEPRESSED OR HOPELESS: 0
SUM OF ALL RESPONSES TO PHQ QUESTIONS 1-9: 0

## 2023-08-22 ASSESSMENT — ENCOUNTER SYMPTOMS: BLOOD IN STOOL: 0

## 2023-08-22 NOTE — PROGRESS NOTES
Rash  Pertinent negatives include no fever. Roberto Stokes is here for continued pruritic rash. He was seen here a few weeks ago and given Diflucan, clotrimazole cream, and an antibiotic. He and his daughter feel that his rash is progressing, now includes both upper extremities. No new soaps, lotions, supplements, meds, does sit on the porch, but wears long pants, denies being in the woods. Denies rash or itching of palms or soles. He has an appointment to see dermatology next month. There is talk of a stent with his cardiologist, he is meeting with them later this week. His daughter reports his blood pressure is usually in the 150-160 range; on questioning, he says he is compliant with his medications.       Past Medical History:   Diagnosis Date    Adrenal mass ([address])     Cerebral hemorrhage ([address])     due to aneurysm    COPD (chronic obstructive pulmonary disease) (HCC)     CVA (cerebral infarction) 1/29/2010    Emphysema 1/29/2010    HTN (hypertension) 1/29/2010    Hypercholesterolemia     Right renal mass     Solitary pulmonary nodule     Type 2 diabetes mellitus (HCC)         Past Surgical History:   Procedure Laterality Date    HERNIA REPAIR      lt inguinal    OTHER SURGICAL HISTORY      INTRACRANIAL ANEURYSM REPAIR    TONSILLECTOMY          Current Outpatient Medications   Medication Sig Dispense Refill    methylPREDNISolone (MEDROL DOSEPACK) 4 MG tablet Take early in day with food, no NSAIDs while taking 1 kit 0    potassium chloride (KLOR-CON M) 20 MEQ extended release tablet Take 1 tablet by mouth daily 90 tablet 3    fluconazole (DIFLUCAN) 150 MG tablet Take 1 tablet by mouth every 7 days for 6 doses 6 tablet 0    predniSONE (DELTASONE) 10 MG tablet       glycerin-hypromellose- 0.2-0.2-1 % SOLN opthalmic solution 1 drop 2 times daily      amLODIPine (NORVASC) 10 MG tablet Take by mouth See Admin Instructions      albuterol sulfate HFA (PROVENTIL;VENTOLIN;PROAIR) 108 (90 Base) MCG/ACT inhaler

## 2023-08-22 NOTE — PROGRESS NOTES
María Thakur presents today for   Chief Complaint   Patient presents with    Rash     Rash- upper and lower body                 1. \"Have you been to the ER, urgent care clinic since your last visit? Hospitalized since your last visit? \" no    2. \"Have you seen or consulted any other health care providers outside of the [address] [place] [address] since your last visit? \" no     3. For patients aged 43-73: Has the patient had a colonoscopy / FIT/ Cologuard? NA - based on age      If the patient is female:    4. For patients aged 43-66: Has the patient had a mammogram within the past 2 years? NA - based on age or sex      11. For patients aged 21-65: Has the patient had a pap smear?  NA - based on age or sex

## 2023-11-02 ENCOUNTER — HOSPITAL ENCOUNTER (OUTPATIENT)
Facility: HOSPITAL | Age: 88
Setting detail: SPECIMEN
Discharge: HOME | End: 2023-11-02
Payer: MEDICARE

## 2023-11-02 DIAGNOSIS — E11.9 TYPE 2 DIABETES MELLITUS WITHOUT COMPLICATION, WITHOUT LONG-TERM CURRENT USE OF INSULIN (HCC): ICD-10-CM

## 2023-11-02 DIAGNOSIS — E78.00 HYPERCHOLESTEROLEMIA: ICD-10-CM

## 2023-11-02 LAB
CHOLEST SERPL-MCNC: 161 MG/DL
EST. AVERAGE GLUCOSE BLD GHB EST-MCNC: 126 MG/DL
HBA1C MFR BLD: 6 % (ref 4.2–5.6)
HDLC SERPL-MCNC: 41 MG/DL (ref 40–60)
HDLC SERPL: 3.9 (ref 0–5)
LDLC SERPL CALC-MCNC: 96.8 MG/DL (ref 0–100)
LIPID PANEL: NORMAL
TRIGL SERPL-MCNC: 116 MG/DL
VLDLC SERPL CALC-MCNC: 23.2 MG/DL

## 2023-11-02 PROCEDURE — 36415 COLL VENOUS BLD VENIPUNCTURE: CPT

## 2023-11-02 PROCEDURE — 80061 LIPID PANEL: CPT

## 2023-11-02 PROCEDURE — 83036 HEMOGLOBIN GLYCOSYLATED A1C: CPT

## 2023-11-09 ENCOUNTER — OFFICE VISIT (OUTPATIENT)
Age: 88
End: 2023-11-09
Payer: MEDICARE

## 2023-11-09 DIAGNOSIS — I10 ESSENTIAL (PRIMARY) HYPERTENSION: ICD-10-CM

## 2023-11-09 DIAGNOSIS — Z23 INFLUENZA VACCINE NEEDED: Chronic | ICD-10-CM

## 2023-11-09 DIAGNOSIS — Z00.00 MEDICARE ANNUAL WELLNESS VISIT, SUBSEQUENT: Primary | ICD-10-CM

## 2023-11-09 DIAGNOSIS — E11.9 TYPE 2 DIABETES MELLITUS WITHOUT COMPLICATION, WITHOUT LONG-TERM CURRENT USE OF INSULIN (HCC): ICD-10-CM

## 2023-11-09 PROCEDURE — 90694 VACC AIIV4 NO PRSRV 0.5ML IM: CPT | Performed by: INTERNAL MEDICINE

## 2023-11-09 ASSESSMENT — LIFESTYLE VARIABLES
HOW OFTEN DO YOU HAVE A DRINK CONTAINING ALCOHOL: NEVER
HOW MANY STANDARD DRINKS CONTAINING ALCOHOL DO YOU HAVE ON A TYPICAL DAY: PATIENT DOES NOT DRINK

## 2023-11-09 ASSESSMENT — PATIENT HEALTH QUESTIONNAIRE - PHQ9
1. LITTLE INTEREST OR PLEASURE IN DOING THINGS: 0
SUM OF ALL RESPONSES TO PHQ QUESTIONS 1-9: 0
2. FEELING DOWN, DEPRESSED OR HOPELESS: 0
SUM OF ALL RESPONSES TO PHQ QUESTIONS 1-9: 0
SUM OF ALL RESPONSES TO PHQ QUESTIONS 1-9: 0
SUM OF ALL RESPONSES TO PHQ9 QUESTIONS 1 & 2: 0
SUM OF ALL RESPONSES TO PHQ QUESTIONS 1-9: 0

## 2023-11-09 NOTE — PROGRESS NOTES
Sara Cazares presents today for   Chief Complaint   Patient presents with    Medicare AWV                 1. \"Have you been to the ER, urgent care clinic since your last visit? Hospitalized since your last visit? \" no    2. \"Have you seen or consulted any other health care providers outside of the [address] [place] [address] since your last visit? \" no     3. For patients aged 43-73: Has the patient had a colonoscopy / FIT/ Cologuard? NA - based on age      If the patient is female:    4. For patients aged 43-66: Has the patient had a mammogram within the past 2 years? NA - based on age or sex      11. For patients aged 21-65: Has the patient had a pap smear?  NA - based on age or sex

## 2023-11-09 NOTE — PROGRESS NOTES
KAILASH Umanzor is here with his daughter for a Medicare wellness/ACP visit. No new issues since last visit. He is compliant with his medications. No new family history reported. Past Medical History:   Diagnosis Date    Adrenal mass ([address])     Cerebral hemorrhage ([address])     due to aneurysm    COPD (chronic obstructive pulmonary disease) (HCC)     CVA (cerebral infarction) 1/29/2010    Emphysema 1/29/2010    HTN (hypertension) 1/29/2010    Hypercholesterolemia     Right renal mass     Solitary pulmonary nodule     Type 2 diabetes mellitus (HCC)         Past Surgical History:   Procedure Laterality Date    HERNIA REPAIR      lt inguinal    OTHER SURGICAL HISTORY      INTRACRANIAL ANEURYSM REPAIR    TONSILLECTOMY          Current Outpatient Medications   Medication Sig Dispense Refill    potassium chloride (KLOR-CON M) 20 MEQ extended release tablet Take 1 tablet by mouth daily 90 tablet 3    glycerin-hypromellose- 0.2-0.2-1 % SOLN opthalmic solution 1 drop 2 times daily      amLODIPine (NORVASC) 10 MG tablet Take by mouth See Admin Instructions      albuterol sulfate HFA (PROVENTIL;VENTOLIN;PROAIR) 108 (90 Base) MCG/ACT inhaler Inhale 2 puffs into the lungs      acetaminophen (TYLENOL) 500 MG tablet Take by mouth every evening      Multiple Vitamin (THERAPEUTIC MULTIVITAMIN PO) Take 1 tablet by mouth      hydroCHLOROthiazide (HYDRODIURIL) 25 MG tablet Take 1 tablet by mouth daily 90 tablet 3    montelukast (SINGULAIR) 10 MG tablet Take 1 tablet by mouth daily 90 tablet 3    rosuvastatin (CRESTOR) 10 MG tablet Take 1 tablet by mouth daily 90 tablet 3    metoprolol succinate (TOPROL XL) 50 MG extended release tablet Take 1 tablet by mouth daily 90 tablet 3    brimonidine (ALPHAGAN P) 0.1 % SOLN INSTILL 1 DROP INTO BOTH EYES TWICE A DAY      fluticasone-salmeterol (ADVAIR) 250-50 MCG/ACT AEPB diskus inhaler Inhale 1 puff into the lungs in the morning and 1 puff in the evening.       latanoprost (XALATAN)

## 2023-11-09 NOTE — PATIENT INSTRUCTIONS
instruction, always ask your healthcare professional. [address] any warranty or liability for your use of this information. Personalized Preventive Plan for Heidi Rowe - 11/9/2023  Medicare offers a range of preventive health benefits. Some of the tests and screenings are paid in full while other may be subject to a deductible, co-insurance, and/or copay. Some of these benefits include a comprehensive review of your medical history including lifestyle, illnesses that may run in your family, and various assessments and screenings as appropriate. After reviewing your medical record and screening and assessments performed today your provider may have ordered immunizations, labs, imaging, and/or referrals for you. A list of these orders (if applicable) as well as your Preventive Care list are included within your After Visit Summary for your review. Other Preventive Recommendations:    A preventive eye exam performed by an eye specialist is recommended every 1-2 years to screen for glaucoma; cataracts, macular degeneration, and other eye disorders. A preventive dental visit is recommended every 6 months. Try to get at least 150 minutes of exercise per week or 10,000 steps per day on a pedometer . Order or download the FREE \"Exercise & Physical Activity: Your Everyday Guide\" from [employer]. Call 2-972.627.7501 or search [employer] online. You need 3374-4775 mg of calcium and 1422-2960 IU of vitamin D per day. It is possible to meet your calcium requirement with diet alone, but a vitamin D supplement is usually necessary to meet this goal.  When exposed to the sun, use a sunscreen that protects against both UVA and UVB radiation with an SPF of 30 or greater. Reapply every 2 to 3 hours or after sweating, drying off with a towel, or swimming. Always wear a seat belt when traveling in a car.  Always wear a helmet when riding a bicycle

## 2023-11-19 VITALS
DIASTOLIC BLOOD PRESSURE: 61 MMHG | HEIGHT: 68 IN | WEIGHT: 180 LBS | SYSTOLIC BLOOD PRESSURE: 143 MMHG | HEART RATE: 56 BPM | BODY MASS INDEX: 27.28 KG/M2 | TEMPERATURE: 98.5 F | RESPIRATION RATE: 18 BRPM | OXYGEN SATURATION: 97 %

## 2023-11-19 ASSESSMENT — ENCOUNTER SYMPTOMS
BLOOD IN STOOL: 0
SHORTNESS OF BREATH: 0

## 2023-11-20 ENCOUNTER — TELEPHONE (OUTPATIENT)
Age: 88
End: 2023-11-20

## 2023-11-20 DIAGNOSIS — J43.8 OTHER EMPHYSEMA (HCC): ICD-10-CM

## 2023-11-20 RX ORDER — MONTELUKAST SODIUM 10 MG/1
10 TABLET ORAL DAILY
Qty: 90 TABLET | Refills: 3 | Status: SHIPPED | OUTPATIENT
Start: 2023-11-20

## 2023-11-20 NOTE — TELEPHONE ENCOUNTER
Refill request via fax    rosuvastatin (CRESTOR) 10 MG tablet 90 tablet 3 3/22/2023     Sig - Route: Take 1 tablet by mouth daily - Oral        Pharmacy: [facility] on [address]

## 2023-12-15 ENCOUNTER — OFFICE VISIT (OUTPATIENT)
Age: 88
End: 2023-12-15
Payer: MEDICARE

## 2023-12-15 VITALS
SYSTOLIC BLOOD PRESSURE: 136 MMHG | HEART RATE: 78 BPM | RESPIRATION RATE: 18 BRPM | DIASTOLIC BLOOD PRESSURE: 66 MMHG | BODY MASS INDEX: 27.74 KG/M2 | OXYGEN SATURATION: 97 % | WEIGHT: 183 LBS | TEMPERATURE: 98.7 F | HEIGHT: 68 IN

## 2023-12-15 DIAGNOSIS — Z09 HOSPITAL DISCHARGE FOLLOW-UP: Primary | ICD-10-CM

## 2023-12-15 DIAGNOSIS — I10 ESSENTIAL (PRIMARY) HYPERTENSION: ICD-10-CM

## 2023-12-15 PROCEDURE — 1123F ACP DISCUSS/DSCN MKR DOCD: CPT | Performed by: INTERNAL MEDICINE

## 2023-12-15 PROCEDURE — G8427 DOCREV CUR MEDS BY ELIG CLIN: HCPCS | Performed by: INTERNAL MEDICINE

## 2023-12-15 PROCEDURE — 99214 OFFICE O/P EST MOD 30 MIN: CPT | Performed by: INTERNAL MEDICINE

## 2023-12-15 PROCEDURE — G8484 FLU IMMUNIZE NO ADMIN: HCPCS | Performed by: INTERNAL MEDICINE

## 2023-12-15 PROCEDURE — 1036F TOBACCO NON-USER: CPT | Performed by: INTERNAL MEDICINE

## 2023-12-15 PROCEDURE — G8417 CALC BMI ABV UP PARAM F/U: HCPCS | Performed by: INTERNAL MEDICINE

## 2023-12-15 ASSESSMENT — PATIENT HEALTH QUESTIONNAIRE - PHQ9
2. FEELING DOWN, DEPRESSED OR HOPELESS: 0
1. LITTLE INTEREST OR PLEASURE IN DOING THINGS: 0
SUM OF ALL RESPONSES TO PHQ QUESTIONS 1-9: 0
SUM OF ALL RESPONSES TO PHQ9 QUESTIONS 1 & 2: 0
SUM OF ALL RESPONSES TO PHQ QUESTIONS 1-9: 0

## 2024-01-24 ENCOUNTER — TELEPHONE (OUTPATIENT)
Age: 89
End: 2024-01-24

## 2024-01-24 DIAGNOSIS — I10 ESSENTIAL (PRIMARY) HYPERTENSION: Primary | ICD-10-CM

## 2024-01-24 RX ORDER — AMLODIPINE BESYLATE 10 MG/1
TABLET ORAL
Qty: 90 TABLET | Refills: 3 | Status: SHIPPED | OUTPATIENT
Start: 2024-01-24

## 2024-05-01 NOTE — PROGRESS NOTES
Giorgio LEO Mott presents today for chronic condition.              1. \"Have you been to the ER, urgent care clinic since your last visit? [facility]  Hospitalized since your last visit?\" no    2. \"Have you seen or consulted any other health care providers outside of the [facility] since your last visit?\" no     3. For patients aged 45-75: Has the patient had a colonoscopy / FIT/ Cologuard? NA - based on age      If the patient is female:    4. For patients aged 40-74: Has the patient had a mammogram within the past 2 years? NA - based on age or sex      5. For patients aged 21-65: Has the patient had a pap smear? NA - based on age or sex

## 2024-05-02 ENCOUNTER — OFFICE VISIT (OUTPATIENT)
Age: 89
End: 2024-05-02
Payer: MEDICARE

## 2024-05-02 VITALS
RESPIRATION RATE: 18 BRPM | OXYGEN SATURATION: 96 % | TEMPERATURE: 100.6 F | DIASTOLIC BLOOD PRESSURE: 76 MMHG | BODY MASS INDEX: 27.58 KG/M2 | WEIGHT: 182 LBS | SYSTOLIC BLOOD PRESSURE: 148 MMHG | HEART RATE: 76 BPM | HEIGHT: 68 IN

## 2024-05-02 DIAGNOSIS — R10.31 RIGHT INGUINAL PAIN: ICD-10-CM

## 2024-05-02 DIAGNOSIS — I10 ESSENTIAL (PRIMARY) HYPERTENSION: Primary | ICD-10-CM

## 2024-05-02 DIAGNOSIS — E11.9 TYPE 2 DIABETES MELLITUS WITHOUT COMPLICATION, WITHOUT LONG-TERM CURRENT USE OF INSULIN (HCC): ICD-10-CM

## 2024-05-02 DIAGNOSIS — E78.00 HYPERCHOLESTEROLEMIA: ICD-10-CM

## 2024-05-02 LAB — HBA1C MFR BLD: 5.8 %

## 2024-05-02 PROCEDURE — G8428 CUR MEDS NOT DOCUMENT: HCPCS | Performed by: INTERNAL MEDICINE

## 2024-05-02 PROCEDURE — 1123F ACP DISCUSS/DSCN MKR DOCD: CPT | Performed by: INTERNAL MEDICINE

## 2024-05-02 PROCEDURE — 3044F HG A1C LEVEL LT 7.0%: CPT | Performed by: INTERNAL MEDICINE

## 2024-05-02 PROCEDURE — G8417 CALC BMI ABV UP PARAM F/U: HCPCS | Performed by: INTERNAL MEDICINE

## 2024-05-02 PROCEDURE — 83036 HEMOGLOBIN GLYCOSYLATED A1C: CPT | Performed by: INTERNAL MEDICINE

## 2024-05-02 PROCEDURE — 99214 OFFICE O/P EST MOD 30 MIN: CPT | Performed by: INTERNAL MEDICINE

## 2024-05-02 PROCEDURE — 1036F TOBACCO NON-USER: CPT | Performed by: INTERNAL MEDICINE

## 2024-05-02 ASSESSMENT — PATIENT HEALTH QUESTIONNAIRE - PHQ9
SUM OF ALL RESPONSES TO PHQ QUESTIONS 1-9: 0
1. LITTLE INTEREST OR PLEASURE IN DOING THINGS: NOT AT ALL
SUM OF ALL RESPONSES TO PHQ QUESTIONS 1-9: 0
2. FEELING DOWN, DEPRESSED OR HOPELESS: NOT AT ALL
SUM OF ALL RESPONSES TO PHQ QUESTIONS 1-9: 0
SUM OF ALL RESPONSES TO PHQ QUESTIONS 1-9: 0
SUM OF ALL RESPONSES TO PHQ9 QUESTIONS 1 & 2: 0

## 2024-05-02 ASSESSMENT — ENCOUNTER SYMPTOMS: BLOOD IN STOOL: 0

## 2024-05-03 NOTE — PROGRESS NOTES
HPI     Giorgio Mott is seen for routine follow-up.  He was seen at an urgent care clinic about 2 weeks ago for right inguinal pain.  He was prescribed Augmentin, possibly for an abscess, but states he never had any pus or other drainage.  The discomfort is about the same.  He may have had some bumps in the area, possibly shingles, but there are no scars to corroborate that.  It does hurt if he walks or moves certain ways, so we will try to ascertain there is no inguinal hernia or DJD of his hip.    Past Medical History:   Diagnosis Date    Adrenal mass (HCC)     Cerebral hemorrhage (HCC)     due to aneurysm    COPD (chronic obstructive pulmonary disease) (HCC)     CVA (cerebral infarction) 1/29/2010    Emphysema 1/29/2010    HTN (hypertension) 1/29/2010    Hypercholesterolemia     Right renal mass     Solitary pulmonary nodule     Type 2 diabetes mellitus (HCC)         Past Surgical History:   Procedure Laterality Date    HERNIA REPAIR      lt inguinal    OTHER SURGICAL HISTORY      INTRACRANIAL ANEURYSM REPAIR    TONSILLECTOMY          Current Outpatient Medications   Medication Sig Dispense Refill    amLODIPine (NORVASC) 10 MG tablet 1 po daily 90 tablet 3    montelukast (SINGULAIR) 10 MG tablet Take 1 tablet by mouth daily 90 tablet 3    potassium chloride (KLOR-CON M) 20 MEQ extended release tablet Take 1 tablet by mouth daily 90 tablet 3    glycerin-hypromellose- 0.2-0.2-1 % SOLN opthalmic solution 1 drop 2 times daily      albuterol sulfate HFA (PROVENTIL;VENTOLIN;PROAIR) 108 (90 Base) MCG/ACT inhaler Inhale 2 puffs into the lungs      acetaminophen (TYLENOL) 500 MG tablet Take by mouth every evening      Multiple Vitamin (THERAPEUTIC MULTIVITAMIN PO) Take 1 tablet by mouth      hydroCHLOROthiazide (HYDRODIURIL) 25 MG tablet Take 1 tablet by mouth daily 90 tablet 3    rosuvastatin (CRESTOR) 10 MG tablet Take 1 tablet by mouth daily 90 tablet 3    brimonidine (ALPHAGAN P) 0.1 % SOLN INSTILL 1 DROP INTO

## 2024-05-09 ENCOUNTER — OFFICE VISIT (OUTPATIENT)
Age: 89
End: 2024-05-09

## 2024-05-09 VITALS
WEIGHT: 184 LBS | HEIGHT: 68 IN | BODY MASS INDEX: 27.89 KG/M2 | TEMPERATURE: 98 F | DIASTOLIC BLOOD PRESSURE: 79 MMHG | SYSTOLIC BLOOD PRESSURE: 163 MMHG | OXYGEN SATURATION: 97 % | HEART RATE: 68 BPM

## 2024-05-09 DIAGNOSIS — C64.2 RENAL CELL CARCINOMA, LEFT (HCC): ICD-10-CM

## 2024-05-09 DIAGNOSIS — I10 HYPERTENSION, UNSPECIFIED TYPE: ICD-10-CM

## 2024-05-09 DIAGNOSIS — E11.9 TYPE 2 DIABETES MELLITUS WITHOUT COMPLICATION, WITHOUT LONG-TERM CURRENT USE OF INSULIN (HCC): ICD-10-CM

## 2024-05-09 DIAGNOSIS — K40.90 RIGHT INGUINAL HERNIA: Primary | ICD-10-CM

## 2024-05-09 NOTE — PROGRESS NOTES
General Surgery Consult      Giorgio Mott  Admit date: (Not on file)    MRN: [id number]     : 1935     Age: 88 y.o.        Attending Physician: Rekha Gage MD [facility]      History of Present Illness:     Giorgio Mott is a 88 y.o. male who was referred to me by Dr. Petersen for evaluation of a symptomatic right inguinal hernia.  The patient looks amazing for his age and he is here today with his daughter and he has a multiple medical conditions including a history of left renal cell cancer that nonresectable it seems and he had an MRI earlier this year that showed the lesion but did not show clear evidence of any hernia at that point.  The patient stated that he had an open left inguinal hernia repair when he was very young and he has been having a bulge in the right groin for the past month or so.  In addition to the bulge has been having some discomfort but no significant pain.     Patient Active Problem List    Diagnosis Date Noted    Respiratory distress 2022    Type 2 diabetes mellitus (HCC)     Cerebral hemorrhage (HCC)     Triggering of digit 2019    Renal cell carcinoma, left (HCC) 2018    Renal lesion 2016    Lightheadedness 2013    HTN (hypertension) 2010    Hypercholesterolemia 2010    Cerebral infarction (HCC) 2010    Pulmonary emphysema (HCC) 2010     Past Medical History:   Diagnosis Date    Adrenal mass (HCC)     Cerebral hemorrhage (HCC)     due to aneurysm    COPD (chronic obstructive pulmonary disease) (HCC)     CVA (cerebral infarction) 2010    Emphysema 2010    HTN (hypertension) 2010    Hypercholesterolemia     Right renal mass     Solitary pulmonary nodule     Type 2 diabetes mellitus (HCC)       Past Surgical History:   Procedure Laterality Date    HERNIA REPAIR      lt inguinal    OTHER SURGICAL HISTORY      INTRACRANIAL ANEURYSM REPAIR    TONSILLECTOMY        Social History     Tobacco Use    Smoking status:

## 2024-05-09 NOTE — PROGRESS NOTES
Giorgio Mott is a 88 y.o. male (: 1935) presenting to address:    Chief Complaint   Patient presents with    New Patient     Right inguinal pain/Referred by Dr. Elsie Petersen       Medication list and allergies have been reviewed with Giorgio Mott and updated as of today's date.     I have gone over all Medical, Surgical and Social History with Giorgio Mott and updated/added the information accordingly.

## 2024-06-26 ENCOUNTER — TELEPHONE (OUTPATIENT)
Facility: CLINIC | Age: 89
End: 2024-06-26

## 2024-06-26 DIAGNOSIS — E78.00 PURE HYPERCHOLESTEROLEMIA, UNSPECIFIED: ICD-10-CM

## 2024-06-26 RX ORDER — ROSUVASTATIN CALCIUM 10 MG/1
10 TABLET, COATED ORAL DAILY
Qty: 90 TABLET | Refills: 3 | Status: SHIPPED | OUTPATIENT
Start: 2024-06-26

## 2024-06-26 NOTE — TELEPHONE ENCOUNTER
Refill   Rosuvastatin   [facility] [address]       Pt states [facility] called and told him and told him they are out of stock

## 2024-07-11 ENCOUNTER — OFFICE VISIT (OUTPATIENT)
Age: 89
End: 2024-07-11

## 2024-07-11 VITALS
TEMPERATURE: 97.8 F | RESPIRATION RATE: 18 BRPM | OXYGEN SATURATION: 99 % | HEART RATE: 62 BPM | SYSTOLIC BLOOD PRESSURE: 148 MMHG | WEIGHT: 181 LBS | DIASTOLIC BLOOD PRESSURE: 72 MMHG | HEIGHT: 68 IN | BODY MASS INDEX: 27.43 KG/M2

## 2024-07-11 DIAGNOSIS — I10 HYPERTENSION, UNSPECIFIED TYPE: ICD-10-CM

## 2024-07-11 DIAGNOSIS — E11.9 TYPE 2 DIABETES MELLITUS WITHOUT COMPLICATION, WITHOUT LONG-TERM CURRENT USE OF INSULIN (HCC): ICD-10-CM

## 2024-07-11 DIAGNOSIS — C64.2 RENAL CELL CARCINOMA, LEFT (HCC): ICD-10-CM

## 2024-07-11 DIAGNOSIS — K40.90 RIGHT INGUINAL HERNIA: Primary | ICD-10-CM

## 2024-07-11 NOTE — PROGRESS NOTES
Chief Complaint   Patient presents with    Follow-up     F/u right inguinal hernia    1. Have you been to the ER, urgent care clinic since your last visit?  Hospitalized since your last visit?No    2. Have you seen or consulted any other health care providers outside of the [facility] since your last visit?  Include any pap smears or colon screening. Yes cardiology

## 2024-07-11 NOTE — PROGRESS NOTES
General Surgery Consult      Giorgio Mott  Admit date: (Not on file)    MRN: [id number]     : 1935     Age: 88 y.o.        Attending Physician: Rekha Gage MD [facility]      History of Present Illness:     Giorgio Mott is a 88 y.o. male who is here today with his daughter for follow up on his symptomatic right inguinal hernia.  I have seen the patient earlier this year and we decided to observe it. The patient looks amazing for his age and he is here today with his daughter and he has a multiple medical conditions including a history of left renal cell cancer that nonresectable it seems and he had an MRI earlier this year that showed the lesion but did not show clear evidence of any hernia at that point.  The patient stated that he had an open left inguinal hernia repair when he was very young and he has been having a bulge in the right groin for the past month or so.  In addition to the bulge has been having some discomfort but no significant pain.     Patient Active Problem List    Diagnosis Date Noted    Respiratory distress 2022    Type 2 diabetes mellitus (HCC)     Cerebral hemorrhage (HCC)     Triggering of digit 2019    Renal cell carcinoma, left (HCC) 2018    Renal lesion 2016    Lightheadedness 2013    HTN (hypertension) 2010    Hypercholesterolemia 2010    Cerebral infarction (HCC) 2010    Pulmonary emphysema (HCC) 2010     Past Medical History:   Diagnosis Date    Adrenal mass (HCC)     Cerebral hemorrhage (HCC)     due to aneurysm    COPD (chronic obstructive pulmonary disease) (HCC)     CVA (cerebral infarction) 2010    Emphysema 2010    HTN (hypertension) 2010    Hypercholesterolemia     Right renal mass     Solitary pulmonary nodule     Type 2 diabetes mellitus (HCC)       Past Surgical History:   Procedure Laterality Date    HERNIA REPAIR      lt inguinal    OTHER SURGICAL HISTORY      INTRACRANIAL ANEURYSM REPAIR

## 2024-07-31 NOTE — PROGRESS NOTES
Giorgio LEO Mott presents today for chronic follow up.              1. \"Have you been to the ER, urgent care clinic since your last visit?  Hospitalized since your last visit?\" no    2. \"Have you seen or consulted any other health care providers outside of the [facility] since your last visit?\" no     3. For patients aged 45-75: Has the patient had a colonoscopy / FIT/ Cologuard? NA - based on age      If the patient is female:    4. For patients aged 40-74: Has the patient had a mammogram within the past 2 years? NA - based on age or sex      5. For patients aged 21-65: Has the patient had a pap smear? NA - based on age or sex

## 2024-08-02 ENCOUNTER — OFFICE VISIT (OUTPATIENT)
Facility: CLINIC | Age: 89
End: 2024-08-02

## 2024-08-02 VITALS
HEIGHT: 68 IN | DIASTOLIC BLOOD PRESSURE: 70 MMHG | RESPIRATION RATE: 20 BRPM | BODY MASS INDEX: 26.98 KG/M2 | HEART RATE: 65 BPM | TEMPERATURE: 97.8 F | SYSTOLIC BLOOD PRESSURE: 146 MMHG | OXYGEN SATURATION: 97 % | WEIGHT: 178 LBS

## 2024-08-02 DIAGNOSIS — J43.8 OTHER EMPHYSEMA (HCC): ICD-10-CM

## 2024-08-02 DIAGNOSIS — R42 DIZZINESS: ICD-10-CM

## 2024-08-02 DIAGNOSIS — E87.6 HYPOKALEMIA: ICD-10-CM

## 2024-08-02 DIAGNOSIS — J12.82 PNEUMONIA DUE TO COVID-19 VIRUS: Primary | ICD-10-CM

## 2024-08-02 DIAGNOSIS — U07.1 PNEUMONIA DUE TO COVID-19 VIRUS: Primary | ICD-10-CM

## 2024-08-02 RX ORDER — POTASSIUM CHLORIDE 20 MEQ/1
40 TABLET, EXTENDED RELEASE ORAL DAILY
Qty: 180 TABLET | Refills: 1 | Status: SHIPPED | OUTPATIENT
Start: 2024-08-02

## 2024-08-02 SDOH — ECONOMIC STABILITY: FOOD INSECURITY: WITHIN THE PAST 12 MONTHS, YOU WORRIED THAT YOUR FOOD WOULD RUN OUT BEFORE YOU GOT MONEY TO BUY MORE.: NEVER TRUE

## 2024-08-02 SDOH — ECONOMIC STABILITY: FOOD INSECURITY: WITHIN THE PAST 12 MONTHS, THE FOOD YOU BOUGHT JUST DIDN'T LAST AND YOU DIDN'T HAVE MONEY TO GET MORE.: NEVER TRUE

## 2024-08-02 SDOH — ECONOMIC STABILITY: INCOME INSECURITY: HOW HARD IS IT FOR YOU TO PAY FOR THE VERY BASICS LIKE FOOD, HOUSING, MEDICAL CARE, AND HEATING?: NOT HARD AT ALL

## 2024-08-02 ASSESSMENT — ENCOUNTER SYMPTOMS
BLOOD IN STOOL: 0
SHORTNESS OF BREATH: 0

## 2024-08-02 NOTE — PROGRESS NOTES
Giorgio Mott presents today for   Chief Complaint   Patient presents with    Follow-up     [facility]           \"Have you been to the ER, urgent care clinic since your last visit?  Hospitalized since your last visit?\"    Yes, [facility].    “Have you seen or consulted any other health care providers outside of [facility] since your last visit?”    NO.

## 2024-08-02 NOTE — PROGRESS NOTES
HPI     Giorgio Mott is here with family after being seen in ER 2 wks ago for cough and dyspnea.  COVID test (+), CXR show atelectasis, possible infiltrate RLL.  Feeling much improved.  Never had fever or loss of taste/ smell.    Complains of dizziness, wonders if due to his BP or Meds given in ER.  Most noticeable when he bens over to wash his face.  Suggestive of BPPV, discussed condition and treatment.    Potassium level was 3.3 in ER, usually 3.5-3.7.  on 20 m Eq Kcl daily, increase to 40  mEq/d    Concerned about diastolic BP around 60, advised this is normal and will not cause symptoms.  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    COPD (chronic obstructive pulmonary disease) (HCC)     CVA (cerebral infarction) 1/29/2010    Emphysema 1/29/2010    HTN (hypertension) 1/29/2010    Hypercholesterolemia     Right renal mass     Solitary pulmonary nodule     Type 2 diabetes mellitus (HCC)         Past Surgical History:   Procedure Laterality Date    HERNIA REPAIR      lt inguinal    OTHER SURGICAL HISTORY      INTRACRANIAL ANEURYSM REPAIR    TONSILLECTOMY          Current Outpatient Medications   Medication Sig Dispense Refill    potassium chloride (KLOR-CON M) 20 MEQ extended release tablet Take 2 tablets by mouth daily 180 tablet 1    rosuvastatin (CRESTOR) 10 MG tablet Take 1 tablet by mouth daily 90 tablet 3    hydroCHLOROthiazide (HYDRODIURIL) 25 MG tablet Take 1 tablet by mouth daily 90 tablet 3    amLODIPine (NORVASC) 10 MG tablet 1 po daily 90 tablet 3    potassium chloride (KLOR-CON M) 20 MEQ extended release tablet Take 1 tablet by mouth daily 90 tablet 3    glycerin-hypromellose- 0.2-0.2-1 % SOLN opthalmic solution 1 drop 2 times daily      albuterol sulfate HFA (PROVENTIL;VENTOLIN;PROAIR) 108 (90 Base) MCG/ACT inhaler Inhale 2 puffs into the lungs      acetaminophen (TYLENOL) 500 MG tablet Take by mouth every

## 2024-09-09 ENCOUNTER — OFFICE VISIT (OUTPATIENT)
Age: 89
End: 2024-09-09
Payer: MEDICARE

## 2024-09-09 VITALS
TEMPERATURE: 97 F | DIASTOLIC BLOOD PRESSURE: 75 MMHG | WEIGHT: 181 LBS | OXYGEN SATURATION: 99 % | HEART RATE: 62 BPM | SYSTOLIC BLOOD PRESSURE: 161 MMHG | HEIGHT: 68 IN | BODY MASS INDEX: 27.43 KG/M2

## 2024-09-09 DIAGNOSIS — E11.9 TYPE 2 DIABETES MELLITUS WITHOUT COMPLICATION, WITHOUT LONG-TERM CURRENT USE OF INSULIN (HCC): ICD-10-CM

## 2024-09-09 DIAGNOSIS — K40.90 RIGHT INGUINAL HERNIA: Primary | ICD-10-CM

## 2024-09-09 DIAGNOSIS — I10 HYPERTENSION, UNSPECIFIED TYPE: ICD-10-CM

## 2024-09-09 DIAGNOSIS — C64.2 RENAL CELL CARCINOMA, LEFT (HCC): ICD-10-CM

## 2024-09-09 PROCEDURE — 3044F HG A1C LEVEL LT 7.0%: CPT | Performed by: SURGERY

## 2024-09-09 PROCEDURE — 99214 OFFICE O/P EST MOD 30 MIN: CPT | Performed by: SURGERY

## 2024-09-09 PROCEDURE — G8417 CALC BMI ABV UP PARAM F/U: HCPCS | Performed by: SURGERY

## 2024-09-09 PROCEDURE — 1123F ACP DISCUSS/DSCN MKR DOCD: CPT | Performed by: SURGERY

## 2024-09-09 PROCEDURE — G8428 CUR MEDS NOT DOCUMENT: HCPCS | Performed by: SURGERY

## 2024-09-09 PROCEDURE — 1036F TOBACCO NON-USER: CPT | Performed by: SURGERY

## 2024-09-10 ENCOUNTER — TELEPHONE (OUTPATIENT)
Age: 89
End: 2024-09-10

## 2024-09-10 ENCOUNTER — PREP FOR PROCEDURE (OUTPATIENT)
Age: 89
End: 2024-09-10

## 2024-09-10 DIAGNOSIS — K40.30 INCARCERATED RIGHT INGUINAL HERNIA: ICD-10-CM

## 2024-09-13 ENCOUNTER — TELEPHONE (OUTPATIENT)
Facility: CLINIC | Age: 89
End: 2024-09-13

## 2024-09-18 PROBLEM — K40.30 INCARCERATED RIGHT INGUINAL HERNIA: Status: ACTIVE | Noted: 2024-09-10

## 2024-10-14 ENCOUNTER — OFFICE VISIT (OUTPATIENT)
Age: 89
End: 2024-10-14
Payer: MEDICARE

## 2024-10-14 VITALS
TEMPERATURE: 98.6 F | WEIGHT: 182 LBS | OXYGEN SATURATION: 97 % | DIASTOLIC BLOOD PRESSURE: 72 MMHG | BODY MASS INDEX: 27.58 KG/M2 | HEART RATE: 66 BPM | HEIGHT: 68 IN | SYSTOLIC BLOOD PRESSURE: 165 MMHG

## 2024-10-14 DIAGNOSIS — K40.30 INCARCERATED RIGHT INGUINAL HERNIA: Primary | ICD-10-CM

## 2024-10-14 DIAGNOSIS — I10 HYPERTENSION, UNSPECIFIED TYPE: ICD-10-CM

## 2024-10-14 DIAGNOSIS — E11.9 TYPE 2 DIABETES MELLITUS WITHOUT COMPLICATION, WITHOUT LONG-TERM CURRENT USE OF INSULIN (HCC): ICD-10-CM

## 2024-10-14 DIAGNOSIS — C64.2 RENAL CELL CARCINOMA, LEFT (HCC): ICD-10-CM

## 2024-10-14 PROCEDURE — G8427 DOCREV CUR MEDS BY ELIG CLIN: HCPCS | Performed by: SURGERY

## 2024-10-14 PROCEDURE — 1123F ACP DISCUSS/DSCN MKR DOCD: CPT | Performed by: SURGERY

## 2024-10-14 PROCEDURE — 1036F TOBACCO NON-USER: CPT | Performed by: SURGERY

## 2024-10-14 PROCEDURE — 99214 OFFICE O/P EST MOD 30 MIN: CPT | Performed by: SURGERY

## 2024-10-14 PROCEDURE — G8417 CALC BMI ABV UP PARAM F/U: HCPCS | Performed by: SURGERY

## 2024-10-14 PROCEDURE — 3044F HG A1C LEVEL LT 7.0%: CPT | Performed by: SURGERY

## 2024-10-14 PROCEDURE — G8484 FLU IMMUNIZE NO ADMIN: HCPCS | Performed by: SURGERY

## 2024-10-14 NOTE — PROGRESS NOTES
Giorgio Mott is a 88 y.o. male (: 1935) presenting to address:    Chief Complaint   Patient presents with    Follow-up     Right inguinal hernia/Discuss Surgery       Medication list and allergies have been reviewed with Giorgio Mott and updated as of today's date.     I have gone over all Medical, Surgical and Social History with Giorgiojeremiah Mott and updated/added the information accordingly.       1. Have you been to the ER, Urgent Care or Hospitalized since your last visit? No          2. Have you followed up with your PCP or any other Physicians since your procedure/ last office visit?   No

## 2024-10-14 NOTE — PROGRESS NOTES
General Surgery Consult      Giorgio Mott  Admit date: (Not on file)    MRN: [id number]     : 1935     Age: 88 y.o.        Attending Physician: Rekha Gage MD [facility]      History of Present Illness:     Giorgio Mott is a 88 y.o. male who is here for follow-up on his symptomatic right inguinal hernia.  The patient has been cleared for the surgery and he is scheduled for this Friday and he is here today with his daughter because he would like to proceed with the surgery.    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    COPD (chronic obstructive pulmonary disease) (HCC)     CVA (cerebral infarction) 2010    Emphysema 2010    HTN (hypertension) 2010    Hypercholesterolemia     Right renal mass     Solitary pulmonary nodule     Type 2 diabetes mellitus (HCC)       Past Surgical History:   Procedure Laterality Date    HERNIA REPAIR      lt inguinal    OTHER SURGICAL HISTORY      INTRACRANIAL ANEURYSM REPAIR    TONSILLECTOMY        Social History     Tobacco Use    Smoking status: Former     Current packs/day: 0.00     Types: Cigarettes     Quit date: 3/15/2009     Years since quitting: 15.5    Smokeless tobacco: Never   Substance Use Topics    Alcohol use: No      Social History     Tobacco Use   Smoking Status Former    Current packs/day: 0.00    Types: Cigarettes    Quit date: 3/15/2009    Years since quitting: 15.5   Smokeless Tobacco

## 2024-10-16 RX ORDER — TIOTROPIUM BROMIDE 18 UG/1
18 CAPSULE ORAL; RESPIRATORY (INHALATION) DAILY
COMMUNITY
Start: 2024-10-03

## 2024-10-16 RX ORDER — ALBUTEROL SULFATE 90 UG/1
2 INHALANT RESPIRATORY (INHALATION) EVERY 6 HOURS PRN
COMMUNITY

## 2024-10-16 NOTE — PERIOP NOTE
Instructions for your surgery at [facility]      Today's Date:  10/16/2024      Patient's Name:  Giorgio Mott           Surgery Date:  10/18/2024              Please enter the main entrance of the hospital and check-in at the front security desk located in the lobby. They will direct you to the area to report for your surgery.     Do NOT eat or drink anything, including candy, gum, or ice chips after midnight prior to your surgery, unless you have specific instructions from your surgeon or anesthesia provider to do so.  Brush your teeth before coming to the hospital. You may swish with water, but do not swallow.  No smoking/Vaping/E-Cigarettes 24 hours prior to the day of surgery.  No alcohol 24 hours prior to the day of surgery.  No recreational drugs for one week prior to the day of surgery.  Bring Photo ID, Insurance information, and Co-pay if required on day of surgery.  Bring in pertinent legal documents, such as, Medical Power of , DNR, Advance Directive, etc.  Leave all valuables, including money/purse, at home.  Remove all jewelry, including ALL body piercings, nail polish, acrylic nails, and makeup (including mascara); no lotions, powders, deodorant, or perfume/cologne/after shave on the skin.  Follow instruction for Hibiclens washes and CHG wipes from surgeon's office.   Glasses and dentures may be worn to the hospital. They must be removed prior to surgery. Please bring case/container for glasses or dentures.   Contact lenses should not be worn on day of surgery.   Call your doctor's office if symptoms of a cold or illness develop within 24-48 hours prior to your surgery.  Call your doctor's office if you have any questions concerning insurance or co-pays.  15. AN ADULT (relative or friend 18 years or older) MUST DRIVE YOU HOME AFTER YOUR SURGERY.  16. Please make arrangements for a responsible adult (18 years or older) to be with you for 24 hours after your surgery.

## 2024-10-17 ENCOUNTER — ANESTHESIA EVENT (OUTPATIENT)
Facility: HOSPITAL | Age: 89
End: 2024-10-17
Payer: MEDICARE

## 2024-10-17 ENCOUNTER — TELEPHONE (OUTPATIENT)
Facility: CLINIC | Age: 89
End: 2024-10-17

## 2024-10-17 DIAGNOSIS — J43.8 OTHER EMPHYSEMA (HCC): Primary | ICD-10-CM

## 2024-10-17 RX ORDER — MONTELUKAST SODIUM 10 MG/1
10 TABLET ORAL DAILY
Qty: 90 TABLET | Refills: 2 | Status: SHIPPED | OUTPATIENT
Start: 2024-10-17

## 2024-10-17 RX ORDER — MONTELUKAST SODIUM 10 MG/1
10 TABLET ORAL DAILY
Qty: 90 TABLET | Refills: 2 | Status: SHIPPED | OUTPATIENT
Start: 2024-10-17 | End: 2024-10-17 | Stop reason: CLARIF

## 2024-10-17 NOTE — TELEPHONE ENCOUNTER
Pt daughter called requesting a refill on medication       montelukast (SINGULAIR) 10 MG tablet     [id number] - [place], [place] - [address] [facility] [address] - P [phone] - F [phone] [[id number]]

## 2024-10-18 ENCOUNTER — HOSPITAL ENCOUNTER (OUTPATIENT)
Facility: HOSPITAL | Age: 89
Setting detail: OUTPATIENT SURGERY
Discharge: HOME | End: 2024-10-18
Attending: SURGERY | Admitting: SURGERY
Payer: MEDICARE

## 2024-10-18 ENCOUNTER — ANESTHESIA (OUTPATIENT)
Facility: HOSPITAL | Age: 89
End: 2024-10-18
Payer: MEDICARE

## 2024-10-18 VITALS
WEIGHT: 182 LBS | OXYGEN SATURATION: 93 % | RESPIRATION RATE: 18 BRPM | BODY MASS INDEX: 27.58 KG/M2 | DIASTOLIC BLOOD PRESSURE: 64 MMHG | HEART RATE: 68 BPM | TEMPERATURE: 97 F | SYSTOLIC BLOOD PRESSURE: 97 MMHG | HEIGHT: 68 IN

## 2024-10-18 DIAGNOSIS — Z87.19 S/P HERNIA REPAIR: Primary | ICD-10-CM

## 2024-10-18 DIAGNOSIS — Z98.890 S/P HERNIA REPAIR: Primary | ICD-10-CM

## 2024-10-18 PROCEDURE — 2709999900 HC NON-CHARGEABLE SUPPLY: Performed by: SURGERY

## 2024-10-18 PROCEDURE — 94761 N-INVAS EAR/PLS OXIMETRY MLT: CPT

## 2024-10-18 PROCEDURE — 2580000003 HC RX 258: Performed by: SURGERY

## 2024-10-18 PROCEDURE — 6360000002 HC RX W HCPCS: Performed by: SURGERY

## 2024-10-18 PROCEDURE — 2580000003 HC RX 258: Performed by: NURSE ANESTHETIST, CERTIFIED REGISTERED

## 2024-10-18 PROCEDURE — 2700000000 HC OXYGEN THERAPY PER DAY

## 2024-10-18 PROCEDURE — 2500000003 HC RX 250 WO HCPCS: Performed by: NURSE ANESTHETIST, CERTIFIED REGISTERED

## 2024-10-18 PROCEDURE — S2900 ROBOTIC SURGICAL SYSTEM: HCPCS | Performed by: SURGERY

## 2024-10-18 PROCEDURE — 49650 LAP ING HERNIA REPAIR INIT: CPT | Performed by: SURGERY

## 2024-10-18 PROCEDURE — 7100000010 HC PHASE II RECOVERY - FIRST 15 MIN: Performed by: SURGERY

## 2024-10-18 PROCEDURE — 7100000011 HC PHASE II RECOVERY - ADDTL 15 MIN: Performed by: SURGERY

## 2024-10-18 PROCEDURE — 3700000000 HC ANESTHESIA ATTENDED CARE: Performed by: SURGERY

## 2024-10-18 PROCEDURE — 6360000002 HC RX W HCPCS: Performed by: NURSE ANESTHETIST, CERTIFIED REGISTERED

## 2024-10-18 PROCEDURE — C1781 MESH (IMPLANTABLE): HCPCS | Performed by: SURGERY

## 2024-10-18 PROCEDURE — 7100000001 HC PACU RECOVERY - ADDTL 15 MIN: Performed by: SURGERY

## 2024-10-18 PROCEDURE — 7100000000 HC PACU RECOVERY - FIRST 15 MIN: Performed by: SURGERY

## 2024-10-18 PROCEDURE — 3600000009 HC SURGERY ROBOT BASE: Performed by: SURGERY

## 2024-10-18 PROCEDURE — 3700000001 HC ADD 15 MINUTES (ANESTHESIA): Performed by: SURGERY

## 2024-10-18 PROCEDURE — 2500000003 HC RX 250 WO HCPCS: Performed by: SURGERY

## 2024-10-18 PROCEDURE — 3600000019 HC SURGERY ROBOT ADDTL 15MIN: Performed by: SURGERY

## 2024-10-18 DEVICE — MESH CS RIGHT LARGE 10CM X 16CM: Type: IMPLANTABLE DEVICE | Site: ABDOMEN | Status: FUNCTIONAL

## 2024-10-18 RX ORDER — GLYCOPYRROLATE 0.2 MG/ML
INJECTION INTRAMUSCULAR; INTRAVENOUS
Status: DISCONTINUED | OUTPATIENT
Start: 2024-10-18 | End: 2024-10-18 | Stop reason: SDUPTHER

## 2024-10-18 RX ORDER — ONDANSETRON 2 MG/ML
INJECTION INTRAMUSCULAR; INTRAVENOUS
Status: DISCONTINUED | OUTPATIENT
Start: 2024-10-18 | End: 2024-10-18 | Stop reason: SDUPTHER

## 2024-10-18 RX ORDER — SODIUM CHLORIDE 0.9 % (FLUSH) 0.9 %
5-40 SYRINGE (ML) INJECTION PRN
Status: DISCONTINUED | OUTPATIENT
Start: 2024-10-18 | End: 2024-10-18 | Stop reason: HOSPADM

## 2024-10-18 RX ORDER — FENTANYL CITRATE 50 UG/ML
INJECTION, SOLUTION INTRAMUSCULAR; INTRAVENOUS
Status: DISCONTINUED | OUTPATIENT
Start: 2024-10-18 | End: 2024-10-18 | Stop reason: SDUPTHER

## 2024-10-18 RX ORDER — OXYCODONE AND ACETAMINOPHEN 5; 325 MG/1; MG/1
1 TABLET ORAL EVERY 6 HOURS PRN
Qty: 12 TABLET | Refills: 0 | Status: SHIPPED | OUTPATIENT
Start: 2024-10-18 | End: 2024-10-21

## 2024-10-18 RX ORDER — ONDANSETRON 2 MG/ML
4 INJECTION INTRAMUSCULAR; INTRAVENOUS
Status: DISCONTINUED | OUTPATIENT
Start: 2024-10-18 | End: 2024-10-18 | Stop reason: HOSPADM

## 2024-10-18 RX ORDER — FENTANYL CITRATE 50 UG/ML
50 INJECTION, SOLUTION INTRAMUSCULAR; INTRAVENOUS EVERY 5 MIN PRN
Status: DISCONTINUED | OUTPATIENT
Start: 2024-10-18 | End: 2024-10-18 | Stop reason: HOSPADM

## 2024-10-18 RX ORDER — DIPHENHYDRAMINE HYDROCHLORIDE 50 MG/ML
12.5 INJECTION INTRAMUSCULAR; INTRAVENOUS
Status: DISCONTINUED | OUTPATIENT
Start: 2024-10-18 | End: 2024-10-18 | Stop reason: HOSPADM

## 2024-10-18 RX ORDER — LIDOCAINE HYDROCHLORIDE 10 MG/ML
1 INJECTION, SOLUTION EPIDURAL; INFILTRATION; INTRACAUDAL; PERINEURAL
Status: DISCONTINUED | OUTPATIENT
Start: 2024-10-18 | End: 2024-10-18 | Stop reason: HOSPADM

## 2024-10-18 RX ORDER — ROCURONIUM BROMIDE 10 MG/ML
INJECTION, SOLUTION INTRAVENOUS
Status: DISCONTINUED | OUTPATIENT
Start: 2024-10-18 | End: 2024-10-18 | Stop reason: SDUPTHER

## 2024-10-18 RX ORDER — INSULIN LISPRO 100 [IU]/ML
0-15 INJECTION, SOLUTION INTRAVENOUS; SUBCUTANEOUS ONCE
Status: DISCONTINUED | OUTPATIENT
Start: 2024-10-18 | End: 2024-10-18 | Stop reason: HOSPADM

## 2024-10-18 RX ORDER — PROPOFOL 10 MG/ML
INJECTION, EMULSION INTRAVENOUS
Status: DISCONTINUED | OUTPATIENT
Start: 2024-10-18 | End: 2024-10-18 | Stop reason: SDUPTHER

## 2024-10-18 RX ORDER — LIDOCAINE HYDROCHLORIDE 20 MG/ML
INJECTION, SOLUTION EPIDURAL; INFILTRATION; INTRACAUDAL; PERINEURAL
Status: DISCONTINUED | OUTPATIENT
Start: 2024-10-18 | End: 2024-10-18 | Stop reason: SDUPTHER

## 2024-10-18 RX ORDER — NALOXONE HYDROCHLORIDE 0.4 MG/ML
INJECTION, SOLUTION INTRAMUSCULAR; INTRAVENOUS; SUBCUTANEOUS PRN
Status: DISCONTINUED | OUTPATIENT
Start: 2024-10-18 | End: 2024-10-18 | Stop reason: HOSPADM

## 2024-10-18 RX ORDER — DEXTROSE MONOHYDRATE 100 MG/ML
INJECTION, SOLUTION INTRAVENOUS CONTINUOUS PRN
Status: DISCONTINUED | OUTPATIENT
Start: 2024-10-18 | End: 2024-10-18 | Stop reason: HOSPADM

## 2024-10-18 RX ORDER — NEOSTIGMINE METHYLSULFATE 1 MG/ML
INJECTION, SOLUTION INTRAVENOUS
Status: DISCONTINUED | OUTPATIENT
Start: 2024-10-18 | End: 2024-10-18 | Stop reason: SDUPTHER

## 2024-10-18 RX ORDER — SODIUM CHLORIDE, SODIUM LACTATE, POTASSIUM CHLORIDE, CALCIUM CHLORIDE 600; 310; 30; 20 MG/100ML; MG/100ML; MG/100ML; MG/100ML
INJECTION, SOLUTION INTRAVENOUS CONTINUOUS
Status: DISCONTINUED | OUTPATIENT
Start: 2024-10-18 | End: 2024-10-18 | Stop reason: HOSPADM

## 2024-10-18 RX ADMIN — SODIUM CHLORIDE, POTASSIUM CHLORIDE, SODIUM LACTATE AND CALCIUM CHLORIDE: 600; 310; 30; 20 INJECTION, SOLUTION INTRAVENOUS at 06:24

## 2024-10-18 RX ADMIN — FAMOTIDINE 20 MG: 10 INJECTION, SOLUTION INTRAVENOUS at 06:23

## 2024-10-18 RX ADMIN — GLYCOPYRROLATE 0.4 MG: 0.2 INJECTION INTRAMUSCULAR; INTRAVENOUS at 08:09

## 2024-10-18 RX ADMIN — WATER 2000 MG: 1 INJECTION INTRAMUSCULAR; INTRAVENOUS; SUBCUTANEOUS at 07:47

## 2024-10-18 RX ADMIN — Medication 3 MG: at 08:09

## 2024-10-18 RX ADMIN — LIDOCAINE HYDROCHLORIDE 50 MG: 20 INJECTION, SOLUTION EPIDURAL; INFILTRATION; INTRACAUDAL; PERINEURAL at 07:42

## 2024-10-18 RX ADMIN — FENTANYL CITRATE 50 MCG: 50 INJECTION INTRAMUSCULAR; INTRAVENOUS at 07:51

## 2024-10-18 RX ADMIN — ONDANSETRON 4 MG: 2 INJECTION, SOLUTION INTRAMUSCULAR; INTRAVENOUS at 07:55

## 2024-10-18 RX ADMIN — PROPOFOL 120 MG: 10 INJECTION, EMULSION INTRAVENOUS at 07:43

## 2024-10-18 RX ADMIN — ROCURONIUM BROMIDE 25 MG: 10 INJECTION, SOLUTION INTRAVENOUS at 07:43

## 2024-10-18 RX ADMIN — FENTANYL CITRATE 50 MCG: 50 INJECTION INTRAMUSCULAR; INTRAVENOUS at 07:42

## 2024-10-18 ASSESSMENT — PAIN SCALES - GENERAL
PAINLEVEL_OUTOF10: 0

## 2024-10-18 ASSESSMENT — PAIN - FUNCTIONAL ASSESSMENT
PAIN_FUNCTIONAL_ASSESSMENT: ACTIVITIES ARE NOT PREVENTED
PAIN_FUNCTIONAL_ASSESSMENT: 0-10
PAIN_FUNCTIONAL_ASSESSMENT: ACTIVITIES ARE NOT PREVENTED
PAIN_FUNCTIONAL_ASSESSMENT: ACTIVITIES ARE NOT PREVENTED

## 2024-10-18 ASSESSMENT — COPD QUESTIONNAIRES: CAT_SEVERITY: MODERATE

## 2024-10-18 ASSESSMENT — ENCOUNTER SYMPTOMS: SHORTNESS OF BREATH: 1

## 2024-10-18 NOTE — ANESTHESIA POSTPROCEDURE EVALUATION
Department of Anesthesiology  Postprocedure Note    Patient: Giorgio Mott  MRN: [id number]  YOB: 1935  Date of evaluation: 10/18/2024    Procedure Summary       Date: 10/18/24 Room / Location: [facility] MAIN 07 / [facility] MAIN OR    Anesthesia Start: 0739 Anesthesia Stop: 0826    Procedure: ROBOTIC INCARCERATED RIGHT INGUINAL HERNIA REPAIR WITH PLACEMENT OF MESH (Right: Abdomen) Diagnosis:       Incarcerated right inguinal hernia      (Incarcerated right inguinal hernia [K40.30])    Surgeons: Rekha Gage MD Responsible Provider: Bry Pascual MD    Anesthesia Type: General ASA Status: 3            Anesthesia Type: General    Jeremy Phase I: Jeremy Score: 10    Jeremy Phase II:      Anesthesia Post Evaluation    Patient location during evaluation: bedside  Patient participation: complete - patient participated  Level of consciousness: awake  Pain score: 0  Airway patency: patent  Nausea & Vomiting: no nausea  Cardiovascular status: hemodynamically stable  Respiratory status: acceptable  Hydration status: euvolemic  Pain management: satisfactory to patient        No notable events documented.

## 2024-10-18 NOTE — PERIOP NOTE
Patient /Family /Designee has been informed that Critical access hospital is not responsible for patient belongings per policy and the signed [facility] Patient Agreement document.  Personal items should be sent home or checked in with security.  Patient /Family /Designee selected the following action:                            [x]  Send personal items home with a family member or friend                                                 []  Check in personal items with security, excluding clothing                            []  Maintain personal items at the bedside, against recommendation                                 by [facility]                                   ** If patient /family /designee chooses to maintain personal items at the bedside,                                      Complete the patient belongings inventory in the EMR.

## 2024-10-18 NOTE — DISCHARGE INSTRUCTIONS
Discharge Instructions Following Surgery    Patient: Giorgio Mott MRN: [id number]  SSN: [id number]    YOB: 1935  Age: 88 y.o.  Sex: male      Activity  As tolerated, walking encourage, stairs are okay.  Avoid strenuous activities - no lifting anything heavier than 15 pounds till seen in the clinic.  You may shower at home after 24 hours.  For the first 24 hours: do not Drive, Drink alcoholic beverages, or make important decisions.  Be aware of dizziness following anesthesia and while taking pain medication.    Diet and Medications  Regular diet after nausea from the anesthetic has passed.  Take pain medication as directed by your doctor.  Call your doctor if pain is NOT relieved by medication.    Wound and Dressing Care  There is glue on the wounds. No need for any dressing care.   Apply ice packs to the area of the surgery for the first 1 to 2 days  Apply warm compresses after 2 days for pain relieve if needed    Call your doctor if  Excessive bleeding that does not stop after holding pressure over the area.  Temperature of 101 degrees F or above.  Redness,excessive swelling or bruising, and/or green or yellow, smelly discharge from incision.  If nausea and vomiting continues.    Follow-Up    Call the office of Dr. Rekha Gage at [phone] to make your follow-up appointment.    Dr. Gage's cell phone number is [phone]. Please call me if you have any concerns or questions.

## 2024-10-18 NOTE — ANESTHESIA PRE PROCEDURE
Department of Anesthesiology  Preprocedure Note       Name:  Giorgio Mott   Age:  88 y.o.  :  1935                                          MRN:  [id number]         Date:  10/18/2024      Surgeon: Surgeon(s):  Rekha Gage MD    Procedure: Procedure(s):  ROBOTIC INCARCERATED RIGHT INGUINAL HERNIA REPAIR WITH PLACEMENT OF MESH    Medications prior to admission:   Prior to Admission medications    Medication Sig Start Date End Date Taking? Authorizing Provider   tiotropium (SPIRIVA) 18 MCG inhalation capsule Inhale 1 capsule into the lungs daily 10/3/24  Yes Brittany Snell MD   albuterol sulfate HFA (VENTOLIN HFA) 108 (90 Base) MCG/ACT inhaler Inhale 2 puffs into the lungs every 6 hours as needed for Wheezing   Yes Brittany Snell MD   montelukast (SINGULAIR) 10 MG tablet Take 1 tablet by mouth daily 10/17/24   Elsie Petersen MD   potassium chloride (KLOR-CON M) 20 MEQ extended release tablet Take 2 tablets by mouth daily 24   Elsie Petersen MD   rosuvastatin (CRESTOR) 10 MG tablet Take 1 tablet by mouth daily 24   Elsie Petersen MD   hydroCHLOROthiazide (HYDRODIURIL) 25 MG tablet Take 1 tablet by mouth daily 24   Elsie Petersen MD   amLODIPine (NORVASC) 10 MG tablet 1 po daily 24   Elsie Petersen MD   potassium chloride (KLOR-CON M) 20 MEQ extended release tablet Take 1 tablet by mouth daily 23   Elisha Corcoran MD   glycerin-hypromellose- 0.2-0.2-1 % SOLN opthalmic solution 1 drop 2 times daily    Brittany Snell MD   acetaminophen (TYLENOL) 500 MG tablet Take by mouth as needed    Brittany Snell MD   Multiple Vitamin (THERAPEUTIC MULTIVITAMIN PO) Take 1 tablet by mouth    Brittany Snell MD   brimonidine (ALPHAGAN P) 0.1 % SOLN INSTILL 1 DROP INTO BOTH EYES TWICE A DAY 19   Automatic Reconciliation, Ar   fluticasone-salmeterol (ADVAIR) 250-50 MCG/ACT AEPB diskus inhaler Inhale 1 puff into the lungs in the morning

## 2024-10-18 NOTE — PROGRESS NOTES
Update History & Physical    The patient's History and Physical was reviewed with the patient and I examined the patient. There was no change. The surgical site was confirmed by the patient and me.       Plan: The risks, benefits, expected outcome, and alternative to the recommended procedure have been discussed with the patient. Patient understands and wants to proceed with the procedure. Will proceed with robotic incarcerated right inguinal hernia repair with placement of mesh.    Electronically signed by Rekha Gage MD on 10/18/2024 at 7:23 AM

## 2024-10-18 NOTE — OP NOTE
Operative Note      Patient: Giorgio Mott  YOB: 1935  MRN: [id number]    Date of Procedure: 10/18/2024    Pre-Op Diagnosis Codes:      * Incarcerated right inguinal hernia [K40.30]    Post-Op Diagnosis: Same       Procedure(s):  ROBOTIC INCARCERATED RIGHT INGUINAL HERNIA REPAIR WITH PLACEMENT OF MESH    Surgeon(s):  Rekha Gage MD    Assistant:   Surgical Assistant: Cecile Weinstein    Anesthesia: General    Estimated Blood Loss (mL): Minimal    Complications: None    Specimens:   * No specimens in log *    Implants:  Implant Name Type Inv. Item Serial No.  Lot No. LRB No. Used Action   MESH CS RIGHT LARGE 10CM X 16CM - [id number]  MESH CS RIGHT LARGE 10CM X 16CM [id number] BARD DAVOL-WD [id number] 1 Implanted         Drains: * No LDAs found *    Findings:  Infection Present At Time Of Surgery (PATOS) (choose all levels that have infection present):  No infection present    Detailed Description of Procedure:   Patient was brought operating room and anesthesia was induced and scrubbing a clinic of the abdomen was in usual manner and a timeout was performed.  A skin incision in the supraumbilical area was performed and Veress needle was inserted and saline drop test was performed and abdomen was insufflated an 8 mm port was inserted.  Abdomen was explored there was no injury from the Veress needle or port placement and under direct visualization 2 other 8 mm ports were placed on the right and left side of the abdominal wall and tap block was performed bilaterally and the patient was placed in Trendelenburg position and the robot was docked.  There was evidence of a large right indirect inguinal hernia so we opened the peritoneum in the right groin area and we dissected the preperitoneal space and we identified the inferior epigastric vessels and we dissected the hernia sac completely and we identified the cord structure including the vas and spermatic vessels and the Tobi  ligament was seen.  We placed a large right-sided Bard 3D mid mesh in the preperitoneal space and we closed the peritoneum with 2 oh V-Loc suture in running fashion to completely cover the mesh.     Electronically signed by Rekha Gage MD on 10/18/2024 at 8:31 AM

## 2024-10-28 ENCOUNTER — OFFICE VISIT (OUTPATIENT)
Age: 89
End: 2024-10-28
Payer: MEDICARE

## 2024-10-28 ENCOUNTER — TELEPHONE (OUTPATIENT)
Facility: CLINIC | Age: 89
End: 2024-10-28

## 2024-10-28 VITALS
BODY MASS INDEX: 28.19 KG/M2 | DIASTOLIC BLOOD PRESSURE: 71 MMHG | OXYGEN SATURATION: 96 % | HEIGHT: 68 IN | TEMPERATURE: 97.7 F | HEART RATE: 66 BPM | WEIGHT: 186 LBS | SYSTOLIC BLOOD PRESSURE: 155 MMHG

## 2024-10-28 DIAGNOSIS — J43.8 OTHER EMPHYSEMA (HCC): ICD-10-CM

## 2024-10-28 DIAGNOSIS — K40.30 INCARCERATED RIGHT INGUINAL HERNIA: Primary | ICD-10-CM

## 2024-10-28 PROCEDURE — 99213 OFFICE O/P EST LOW 20 MIN: CPT | Performed by: SURGERY

## 2024-10-28 PROCEDURE — G8428 CUR MEDS NOT DOCUMENT: HCPCS | Performed by: SURGERY

## 2024-10-28 PROCEDURE — G8484 FLU IMMUNIZE NO ADMIN: HCPCS | Performed by: SURGERY

## 2024-10-28 PROCEDURE — 1126F AMNT PAIN NOTED NONE PRSNT: CPT | Performed by: SURGERY

## 2024-10-28 PROCEDURE — 1123F ACP DISCUSS/DSCN MKR DOCD: CPT | Performed by: SURGERY

## 2024-10-28 PROCEDURE — G8417 CALC BMI ABV UP PARAM F/U: HCPCS | Performed by: SURGERY

## 2024-10-28 PROCEDURE — 1036F TOBACCO NON-USER: CPT | Performed by: SURGERY

## 2024-10-28 NOTE — TELEPHONE ENCOUNTER
Last filled at [facility] 10/17/2024  LOV: 8/2/2024  NOV: 12/16/2024

## 2024-10-28 NOTE — PROGRESS NOTES
Patient seen and examined.  He is doing really very well.  He is tolerating a regular diet.  His abdomen is soft and nontender and his wounds are healing well.  Follow-up as needed.

## 2024-10-28 NOTE — PROGRESS NOTES
Giorgio Mott is a 89 y.o. male (: 1935) presenting to address:    Chief Complaint   Patient presents with    Post-Op Check     Repair of incarcerated right inguinal hernia with large right 3D bard mid mesh 10/18/24       Medication list and allergies have been reviewed with Giorgio Mott and updated as of today's date.     I have gone over all Medical, Surgical and Social History with Giorgio Mott and updated/added the information accordingly.       1. Have you been to the ER, Urgent Care or Hospitalized since your last visit? No          2. Have you followed up with your PCP or any other Physicians since your procedure/ last office visit?   No

## 2024-10-28 NOTE — TELEPHONE ENCOUNTER
Refill request      montelukast (SINGULAIR) 10 MG tablet     [facility] - [address] - [facility] - P [phone] - F [phone] [23]

## 2024-10-29 RX ORDER — MONTELUKAST SODIUM 10 MG/1
10 TABLET ORAL DAILY
Qty: 90 TABLET | Refills: 2 | Status: CANCELLED | OUTPATIENT
Start: 2024-10-29

## (undated) DEVICE — TIP COVER ACCESSORY

## (undated) DEVICE — SOLUTION IRRIG 1000ML 0.9% SOD CHL USP POUR PLAS BTL

## (undated) DEVICE — SUTURE VICRYL SZ 2-0 L27IN ABSRB UD L26MM SH 1/2 CIR J417H

## (undated) DEVICE — DRAPE TOWEL: Brand: CONVERTORS

## (undated) DEVICE — ELECTRODE PT RET AD L9FT HI MOIST COND ADH HYDRGEL CORDED

## (undated) DEVICE — SYRINGE MED 10ML LUERLOCK TIP W/O SFTY DISP

## (undated) DEVICE — LIQUIBAND RAPID ADHESIVE 36/CS 0.8ML: Brand: MEDLINE

## (undated) DEVICE — SUTURE MONOCRYL SZ 4-0 L18IN ABSRB UD L19MM PS-2 3/8 CIR PRIM Y496G

## (undated) DEVICE — GLOVE ORANGE PI 7 1/2   MSG9075

## (undated) DEVICE — ARM DRAPE

## (undated) DEVICE — SUTURE VLOC 90 2/0 VL 6 GS-22 VLOCM2105

## (undated) DEVICE — SYRINGE MED 30ML STD CLR PLAS LUERLOCK TIP N CTRL DISP

## (undated) DEVICE — APPLICATOR MEDICATED 26 CC SOLUTION HI LT ORNG CHLORAPREP

## (undated) DEVICE — ELECTRO LUBE IS A SINGLE PATIENT USE DEVICE THAT IS INTENDED TO BE USED ON ELECTROSURGICAL ELECTRODES TO REDUCE STICKING.: Brand: KEY SURGICAL ELECTRO LUBE

## (undated) DEVICE — BLADELESS OBTURATOR: Brand: WECK VISTA

## (undated) DEVICE — COLUMN DRAPE

## (undated) DEVICE — SEAL

## (undated) DEVICE — NEEDLE SYRINGE 18GA L1.5IN RED PLAS HUB S STL BLNT FILL W/O

## (undated) DEVICE — Device